# Patient Record
Sex: FEMALE | Race: ASIAN | NOT HISPANIC OR LATINO
[De-identification: names, ages, dates, MRNs, and addresses within clinical notes are randomized per-mention and may not be internally consistent; named-entity substitution may affect disease eponyms.]

---

## 2018-08-25 ENCOUNTER — NON-APPOINTMENT (OUTPATIENT)
Age: 78
End: 2018-08-25

## 2018-08-25 ENCOUNTER — APPOINTMENT (OUTPATIENT)
Dept: INTERNAL MEDICINE | Facility: CLINIC | Age: 78
End: 2018-08-25
Payer: MEDICARE

## 2018-08-25 VITALS
SYSTOLIC BLOOD PRESSURE: 120 MMHG | BODY MASS INDEX: 23.95 KG/M2 | DIASTOLIC BLOOD PRESSURE: 80 MMHG | WEIGHT: 122 LBS | HEIGHT: 60 IN

## 2018-08-25 DIAGNOSIS — Z80.8 FAMILY HISTORY OF MALIGNANT NEOPLASM OF OTHER ORGANS OR SYSTEMS: ICD-10-CM

## 2018-08-25 DIAGNOSIS — Z84.89 FAMILY HISTORY OF OTHER SPECIFIED CONDITIONS: ICD-10-CM

## 2018-08-25 DIAGNOSIS — Z13.820 ENCOUNTER FOR SCREENING FOR OSTEOPOROSIS: ICD-10-CM

## 2018-08-25 DIAGNOSIS — Z78.9 OTHER SPECIFIED HEALTH STATUS: ICD-10-CM

## 2018-08-25 DIAGNOSIS — Z86.79 PERSONAL HISTORY OF OTHER DISEASES OF THE CIRCULATORY SYSTEM: ICD-10-CM

## 2018-08-25 DIAGNOSIS — M85.80 OTHER SPECIFIED DISORDERS OF BONE DENSITY AND STRUCTURE, UNSPECIFIED SITE: ICD-10-CM

## 2018-08-25 DIAGNOSIS — H93.12 TINNITUS, LEFT EAR: ICD-10-CM

## 2018-08-25 PROCEDURE — G0439: CPT | Mod: 25

## 2018-08-25 PROCEDURE — 93000 ELECTROCARDIOGRAM COMPLETE: CPT

## 2018-08-25 RX ORDER — LEVOTHYROXINE SODIUM 88 UG/1
88 TABLET ORAL
Qty: 90 | Refills: 0 | Status: DISCONTINUED | COMMUNITY
Start: 2018-04-23 | End: 2018-08-25

## 2018-08-27 ENCOUNTER — RX RENEWAL (OUTPATIENT)
Age: 78
End: 2018-08-27

## 2018-08-27 PROBLEM — Z80.8 FAMILY HISTORY OF MALIGNANT NEOPLASM OF BRAIN: Status: ACTIVE | Noted: 2018-08-27

## 2018-08-27 PROBLEM — H93.12 TINNITUS OF LEFT EAR: Status: ACTIVE | Noted: 2018-08-27

## 2018-08-27 RX ORDER — CARBOXYMETHYLCELLULOSE SODIUM 10 MG/ML
1 GEL OPHTHALMIC
Refills: 0 | Status: ACTIVE | COMMUNITY
Start: 2018-08-27

## 2018-08-27 RX ORDER — VIT C/E/CUPERIC/ZINC/LUTEIN 226-90-0.8
CAPSULE ORAL
Refills: 0 | Status: ACTIVE | COMMUNITY
Start: 2018-08-27

## 2018-08-28 PROBLEM — Z86.79 HISTORY OF CARDIAC MURMUR: Status: RESOLVED | Noted: 2018-08-27 | Resolved: 2018-08-28

## 2018-08-28 PROBLEM — M85.80 OSTEOPENIA: Status: ACTIVE | Noted: 2018-08-28

## 2018-08-28 PROBLEM — Z13.820 SCREENING FOR OSTEOPOROSIS: Status: ACTIVE | Noted: 2018-08-28

## 2018-12-12 ENCOUNTER — RX RENEWAL (OUTPATIENT)
Age: 78
End: 2018-12-12

## 2018-12-18 ENCOUNTER — MEDICATION RENEWAL (OUTPATIENT)
Age: 78
End: 2018-12-18

## 2018-12-18 ENCOUNTER — RX RENEWAL (OUTPATIENT)
Age: 78
End: 2018-12-18

## 2019-01-16 ENCOUNTER — APPOINTMENT (OUTPATIENT)
Dept: INTERNAL MEDICINE | Facility: CLINIC | Age: 79
End: 2019-01-16
Payer: MEDICARE

## 2019-01-16 PROCEDURE — 90662 IIV NO PRSV INCREASED AG IM: CPT

## 2019-01-16 PROCEDURE — G0008: CPT

## 2019-01-25 ENCOUNTER — APPOINTMENT (OUTPATIENT)
Dept: INTERNAL MEDICINE | Facility: CLINIC | Age: 79
End: 2019-01-25
Payer: MEDICARE

## 2019-01-25 VITALS
HEIGHT: 60 IN | SYSTOLIC BLOOD PRESSURE: 120 MMHG | BODY MASS INDEX: 23.56 KG/M2 | DIASTOLIC BLOOD PRESSURE: 70 MMHG | WEIGHT: 120 LBS

## 2019-01-25 PROCEDURE — 99214 OFFICE O/P EST MOD 30 MIN: CPT

## 2019-03-09 RX ORDER — SITAGLIPTIN 50 MG/1
50 TABLET, FILM COATED ORAL
Qty: 90 | Refills: 0 | Status: DISCONTINUED | COMMUNITY
Start: 2019-01-25 | End: 2019-03-09

## 2019-03-29 ENCOUNTER — APPOINTMENT (OUTPATIENT)
Dept: INTERNAL MEDICINE | Facility: CLINIC | Age: 79
End: 2019-03-29
Payer: MEDICARE

## 2019-03-29 VITALS
SYSTOLIC BLOOD PRESSURE: 130 MMHG | WEIGHT: 122 LBS | DIASTOLIC BLOOD PRESSURE: 60 MMHG | HEIGHT: 61.5 IN | BODY MASS INDEX: 22.74 KG/M2

## 2019-03-29 DIAGNOSIS — R01.1 CARDIAC MURMUR, UNSPECIFIED: ICD-10-CM

## 2019-03-29 PROCEDURE — 90746 HEPB VACCINE 3 DOSE ADULT IM: CPT

## 2019-03-29 PROCEDURE — 90471 IMMUNIZATION ADMIN: CPT

## 2019-03-29 PROCEDURE — 99214 OFFICE O/P EST MOD 30 MIN: CPT | Mod: 25

## 2019-03-30 ENCOUNTER — RX RENEWAL (OUTPATIENT)
Age: 79
End: 2019-03-30

## 2019-04-27 ENCOUNTER — APPOINTMENT (OUTPATIENT)
Dept: INTERNAL MEDICINE | Facility: CLINIC | Age: 79
End: 2019-04-27
Payer: MEDICARE

## 2019-04-27 PROCEDURE — 36415 COLL VENOUS BLD VENIPUNCTURE: CPT

## 2019-06-10 ENCOUNTER — RX RENEWAL (OUTPATIENT)
Age: 79
End: 2019-06-10

## 2019-06-11 ENCOUNTER — RX RENEWAL (OUTPATIENT)
Age: 79
End: 2019-06-11

## 2019-06-19 ENCOUNTER — MEDICATION RENEWAL (OUTPATIENT)
Age: 79
End: 2019-06-19

## 2019-06-19 ENCOUNTER — RX RENEWAL (OUTPATIENT)
Age: 79
End: 2019-06-19

## 2019-07-16 ENCOUNTER — APPOINTMENT (OUTPATIENT)
Dept: INTERNAL MEDICINE | Facility: CLINIC | Age: 79
End: 2019-07-16
Payer: MEDICARE

## 2019-07-16 VITALS
WEIGHT: 125 LBS | SYSTOLIC BLOOD PRESSURE: 120 MMHG | HEIGHT: 61.5 IN | BODY MASS INDEX: 23.3 KG/M2 | DIASTOLIC BLOOD PRESSURE: 78 MMHG

## 2019-07-16 PROCEDURE — 99214 OFFICE O/P EST MOD 30 MIN: CPT

## 2019-07-16 RX ORDER — PRASUGREL 10 MG/1
10 TABLET, FILM COATED ORAL DAILY
Qty: 90 | Refills: 1 | Status: DISCONTINUED | COMMUNITY
Start: 2018-06-11 | End: 2019-07-16

## 2019-07-27 ENCOUNTER — APPOINTMENT (OUTPATIENT)
Dept: INTERNAL MEDICINE | Facility: CLINIC | Age: 79
End: 2019-07-27
Payer: MEDICARE

## 2019-07-27 VITALS — WEIGHT: 127 LBS | HEIGHT: 61.5 IN | BODY MASS INDEX: 23.67 KG/M2

## 2019-07-27 DIAGNOSIS — Z23 ENCOUNTER FOR IMMUNIZATION: ICD-10-CM

## 2019-07-27 PROCEDURE — 90746 HEPB VACCINE 3 DOSE ADULT IM: CPT

## 2019-07-27 PROCEDURE — 90471 IMMUNIZATION ADMIN: CPT

## 2019-07-29 PROBLEM — Z23 NEED FOR HEPATITIS B VACCINATION: Status: ACTIVE | Noted: 2019-07-29

## 2019-10-11 ENCOUNTER — APPOINTMENT (OUTPATIENT)
Dept: INTERNAL MEDICINE | Facility: CLINIC | Age: 79
End: 2019-10-11
Payer: MEDICARE

## 2019-10-11 ENCOUNTER — LABORATORY RESULT (OUTPATIENT)
Age: 79
End: 2019-10-11

## 2019-10-11 VITALS
DIASTOLIC BLOOD PRESSURE: 70 MMHG | BODY MASS INDEX: 24.74 KG/M2 | WEIGHT: 126 LBS | HEIGHT: 60 IN | SYSTOLIC BLOOD PRESSURE: 120 MMHG

## 2019-10-11 DIAGNOSIS — R35.0 FREQUENCY OF MICTURITION: ICD-10-CM

## 2019-10-11 PROCEDURE — 81003 URINALYSIS AUTO W/O SCOPE: CPT | Mod: QW

## 2019-10-11 PROCEDURE — 99214 OFFICE O/P EST MOD 30 MIN: CPT | Mod: 25

## 2019-10-11 RX ORDER — LEVOTHYROXINE SODIUM 75 UG/1
75 TABLET ORAL
Qty: 90 | Refills: 1 | Status: DISCONTINUED | COMMUNITY
Start: 2018-08-25 | End: 2019-10-11

## 2019-10-12 PROBLEM — R35.0 URINARY FREQUENCY: Status: ACTIVE | Noted: 2019-10-11

## 2019-10-12 LAB
BILIRUB UR QL STRIP: NORMAL
CLARITY UR: CLEAR
COLLECTION METHOD: NORMAL
GLUCOSE UR-MCNC: NORMAL
HCG UR QL: 0.2 EU/DL
HGB UR QL STRIP.AUTO: NORMAL
KETONES UR-MCNC: NORMAL
LEUKOCYTE ESTERASE UR QL STRIP: NORMAL
NITRITE UR QL STRIP: NORMAL
PH UR STRIP: 7
PROT UR STRIP-MCNC: NORMAL
SP GR UR STRIP: 1.01

## 2019-10-28 LAB — BACTERIA UR CULT: ABNORMAL

## 2019-11-20 ENCOUNTER — RX RENEWAL (OUTPATIENT)
Age: 79
End: 2019-11-20

## 2019-11-22 ENCOUNTER — RX RENEWAL (OUTPATIENT)
Age: 79
End: 2019-11-22

## 2019-12-03 ENCOUNTER — RX RENEWAL (OUTPATIENT)
Age: 79
End: 2019-12-03

## 2019-12-21 ENCOUNTER — APPOINTMENT (OUTPATIENT)
Dept: INTERNAL MEDICINE | Facility: CLINIC | Age: 79
End: 2019-12-21
Payer: MEDICARE

## 2019-12-21 VITALS
SYSTOLIC BLOOD PRESSURE: 130 MMHG | WEIGHT: 125 LBS | BODY MASS INDEX: 24.54 KG/M2 | DIASTOLIC BLOOD PRESSURE: 80 MMHG | HEIGHT: 60 IN

## 2019-12-21 PROCEDURE — 99214 OFFICE O/P EST MOD 30 MIN: CPT

## 2019-12-21 RX ORDER — RAMIPRIL 5 MG/1
5 CAPSULE ORAL
Qty: 90 | Refills: 1 | Status: DISCONTINUED | COMMUNITY
Start: 2018-03-12 | End: 2019-12-21

## 2019-12-21 RX ORDER — NITROFURANTOIN (MONOHYDRATE/MACROCRYSTALS) 25; 75 MG/1; MG/1
100 CAPSULE ORAL
Qty: 10 | Refills: 0 | Status: DISCONTINUED | COMMUNITY
Start: 2019-10-11 | End: 2019-12-21

## 2019-12-21 RX ORDER — VALSARTAN 160 MG/1
160 TABLET ORAL
Refills: 0 | Status: DISCONTINUED | COMMUNITY
Start: 2019-06-04 | End: 2019-12-18

## 2019-12-21 NOTE — PHYSICAL EXAM
[No Acute Distress] : no acute distress [Well Nourished] : well nourished [No Accessory Muscle Use] : no accessory muscle use [Clear to Auscultation] : lungs were clear to auscultation bilaterally [Regular Rhythm] : with a regular rhythm [Normal S1, S2] : normal S1 and S2 [No Murmur] : no murmur heard [No Edema] : there was no peripheral edema [Soft] : abdomen soft [Non Tender] : non-tender [Non-distended] : non-distended [No Masses] : no abdominal mass palpated [No HSM] : no HSM [Normal Affect] : the affect was normal [Normal Bowel Sounds] : normal bowel sounds [Normal Insight/Judgement] : insight and judgment were intact

## 2019-12-21 NOTE — HISTORY OF PRESENT ILLNESS
[Spouse] : spouse [FreeTextEntry1] : DM  [de-identified] : Of note - pt had seen cardiologist and diovan doubled to 360mg - then experience palpitations - went to ED had negative w/u including eKG with NSR PACs and PVCs \par pt self d/c'd diovan - now only on amlodipine\par no cardiac complaints\par no respiratory complaints\par \par hba1c @ hospital 7.4%

## 2020-01-31 ENCOUNTER — RX RENEWAL (OUTPATIENT)
Age: 80
End: 2020-01-31

## 2020-02-28 ENCOUNTER — RX RENEWAL (OUTPATIENT)
Age: 80
End: 2020-02-28

## 2020-03-07 ENCOUNTER — NON-APPOINTMENT (OUTPATIENT)
Age: 80
End: 2020-03-07

## 2020-03-07 ENCOUNTER — APPOINTMENT (OUTPATIENT)
Dept: INTERNAL MEDICINE | Facility: CLINIC | Age: 80
End: 2020-03-07
Payer: MEDICARE

## 2020-03-07 VITALS
SYSTOLIC BLOOD PRESSURE: 130 MMHG | HEIGHT: 60 IN | BODY MASS INDEX: 24.35 KG/M2 | DIASTOLIC BLOOD PRESSURE: 70 MMHG | WEIGHT: 124 LBS

## 2020-03-07 PROCEDURE — 93000 ELECTROCARDIOGRAM COMPLETE: CPT

## 2020-03-07 PROCEDURE — G0439: CPT | Mod: 25

## 2020-04-06 ENCOUNTER — RX RENEWAL (OUTPATIENT)
Age: 80
End: 2020-04-06

## 2020-06-18 ENCOUNTER — APPOINTMENT (OUTPATIENT)
Dept: INTERNAL MEDICINE | Facility: CLINIC | Age: 80
End: 2020-06-18

## 2020-07-18 ENCOUNTER — APPOINTMENT (OUTPATIENT)
Dept: INTERNAL MEDICINE | Facility: CLINIC | Age: 80
End: 2020-07-18
Payer: MEDICARE

## 2020-07-18 VITALS — HEIGHT: 60 IN | BODY MASS INDEX: 24.94 KG/M2 | TEMPERATURE: 98.7 F | WEIGHT: 127 LBS

## 2020-07-18 PROCEDURE — 99214 OFFICE O/P EST MOD 30 MIN: CPT

## 2020-07-18 RX ORDER — GLIMEPIRIDE 1 MG/1
1 TABLET ORAL DAILY
Qty: 60 | Refills: 0 | Status: DISCONTINUED | COMMUNITY
Start: 2020-07-08 | End: 2020-07-18

## 2020-08-15 ENCOUNTER — APPOINTMENT (OUTPATIENT)
Dept: INTERNAL MEDICINE | Facility: CLINIC | Age: 80
End: 2020-08-15

## 2020-08-29 ENCOUNTER — APPOINTMENT (OUTPATIENT)
Dept: INTERNAL MEDICINE | Facility: CLINIC | Age: 80
End: 2020-08-29
Payer: MEDICARE

## 2020-08-29 VITALS
DIASTOLIC BLOOD PRESSURE: 70 MMHG | WEIGHT: 125 LBS | SYSTOLIC BLOOD PRESSURE: 120 MMHG | BODY MASS INDEX: 24.54 KG/M2 | HEIGHT: 60 IN | TEMPERATURE: 97.9 F

## 2020-08-29 PROCEDURE — 99213 OFFICE O/P EST LOW 20 MIN: CPT

## 2020-08-29 RX ORDER — AMLODIPINE BESYLATE 5 MG/1
5 TABLET ORAL
Qty: 90 | Refills: 1 | Status: DISCONTINUED | COMMUNITY
Start: 2018-03-12 | End: 2020-08-29

## 2020-09-09 ENCOUNTER — RX RENEWAL (OUTPATIENT)
Age: 80
End: 2020-09-09

## 2020-10-08 ENCOUNTER — RX RENEWAL (OUTPATIENT)
Age: 80
End: 2020-10-08

## 2020-10-31 ENCOUNTER — APPOINTMENT (OUTPATIENT)
Dept: INTERNAL MEDICINE | Facility: CLINIC | Age: 80
End: 2020-10-31
Payer: MEDICARE

## 2020-10-31 VITALS
HEART RATE: 85 BPM | HEIGHT: 62 IN | WEIGHT: 124 LBS | BODY MASS INDEX: 22.82 KG/M2 | TEMPERATURE: 94.7 F | OXYGEN SATURATION: 100 %

## 2020-10-31 VITALS — DIASTOLIC BLOOD PRESSURE: 80 MMHG | SYSTOLIC BLOOD PRESSURE: 150 MMHG

## 2020-10-31 DIAGNOSIS — N81.4 UTEROVAGINAL PROLAPSE, UNSPECIFIED: ICD-10-CM

## 2020-10-31 DIAGNOSIS — Z23 ENCOUNTER FOR IMMUNIZATION: ICD-10-CM

## 2020-10-31 DIAGNOSIS — R92.2 INCONCLUSIVE MAMMOGRAM: ICD-10-CM

## 2020-10-31 PROCEDURE — 99072 ADDL SUPL MATRL&STAF TM PHE: CPT

## 2020-10-31 PROCEDURE — G0008: CPT

## 2020-10-31 PROCEDURE — 99214 OFFICE O/P EST MOD 30 MIN: CPT | Mod: 25

## 2020-10-31 PROCEDURE — 90662 IIV NO PRSV INCREASED AG IM: CPT

## 2020-10-31 RX ORDER — RAMIPRIL 5 MG/1
5 CAPSULE ORAL
Qty: 90 | Refills: 1 | Status: DISCONTINUED | COMMUNITY
Start: 2020-07-18 | End: 2020-10-31

## 2020-12-02 ENCOUNTER — NON-APPOINTMENT (OUTPATIENT)
Age: 80
End: 2020-12-02

## 2020-12-14 DIAGNOSIS — R91.8 OTHER NONSPECIFIC ABNORMAL FINDING OF LUNG FIELD: ICD-10-CM

## 2021-01-09 ENCOUNTER — APPOINTMENT (OUTPATIENT)
Dept: INTERNAL MEDICINE | Facility: CLINIC | Age: 81
End: 2021-01-09
Payer: MEDICARE

## 2021-01-09 VITALS — HEIGHT: 62 IN | BODY MASS INDEX: 23 KG/M2 | WEIGHT: 125 LBS | TEMPERATURE: 98 F

## 2021-01-09 VITALS — SYSTOLIC BLOOD PRESSURE: 120 MMHG | DIASTOLIC BLOOD PRESSURE: 80 MMHG

## 2021-01-09 PROCEDURE — 99072 ADDL SUPL MATRL&STAF TM PHE: CPT

## 2021-01-09 PROCEDURE — 99214 OFFICE O/P EST MOD 30 MIN: CPT

## 2021-04-09 ENCOUNTER — RX RENEWAL (OUTPATIENT)
Age: 81
End: 2021-04-09

## 2021-07-09 ENCOUNTER — RX RENEWAL (OUTPATIENT)
Age: 81
End: 2021-07-09

## 2021-09-21 ENCOUNTER — RX RENEWAL (OUTPATIENT)
Age: 81
End: 2021-09-21

## 2021-10-06 ENCOUNTER — RX RENEWAL (OUTPATIENT)
Age: 81
End: 2021-10-06

## 2021-10-13 ENCOUNTER — RX RENEWAL (OUTPATIENT)
Age: 81
End: 2021-10-13

## 2021-10-13 RX ORDER — AMLODIPINE BESYLATE 5 MG/1
5 TABLET ORAL
Qty: 90 | Refills: 2 | Status: ACTIVE | COMMUNITY
Start: 2020-10-31 | End: 1900-01-01

## 2021-10-25 ENCOUNTER — RX RENEWAL (OUTPATIENT)
Age: 81
End: 2021-10-25

## 2021-10-30 ENCOUNTER — APPOINTMENT (OUTPATIENT)
Dept: INTERNAL MEDICINE | Facility: CLINIC | Age: 81
End: 2021-10-30
Payer: MEDICARE

## 2021-10-30 VITALS — DIASTOLIC BLOOD PRESSURE: 70 MMHG | SYSTOLIC BLOOD PRESSURE: 170 MMHG

## 2021-10-30 VITALS — WEIGHT: 124 LBS | HEIGHT: 62 IN | BODY MASS INDEX: 22.82 KG/M2

## 2021-10-30 DIAGNOSIS — Z96.0 PRESENCE OF UROGENITAL IMPLANTS: ICD-10-CM

## 2021-10-30 DIAGNOSIS — R14.0 ABDOMINAL DISTENSION (GASEOUS): ICD-10-CM

## 2021-10-30 PROCEDURE — 99214 OFFICE O/P EST MOD 30 MIN: CPT

## 2021-10-30 RX ORDER — VALSARTAN 160 MG/1
160 TABLET, COATED ORAL TWICE DAILY
Qty: 180 | Refills: 1 | Status: ACTIVE | COMMUNITY
Start: 2020-10-31

## 2021-11-04 ENCOUNTER — NON-APPOINTMENT (OUTPATIENT)
Age: 81
End: 2021-11-04

## 2021-11-15 ENCOUNTER — RX RENEWAL (OUTPATIENT)
Age: 81
End: 2021-11-15

## 2021-12-18 ENCOUNTER — APPOINTMENT (OUTPATIENT)
Dept: DISASTER EMERGENCY | Facility: CLINIC | Age: 81
End: 2021-12-18

## 2021-12-20 ENCOUNTER — APPOINTMENT (OUTPATIENT)
Dept: PULMONOLOGY | Facility: CLINIC | Age: 81
End: 2021-12-20
Payer: MEDICARE

## 2021-12-20 VITALS
WEIGHT: 125 LBS | DIASTOLIC BLOOD PRESSURE: 68 MMHG | HEIGHT: 61 IN | OXYGEN SATURATION: 98 % | BODY MASS INDEX: 23.6 KG/M2 | HEART RATE: 81 BPM | TEMPERATURE: 98.7 F | SYSTOLIC BLOOD PRESSURE: 138 MMHG

## 2021-12-20 VITALS
TEMPERATURE: 97.6 F | DIASTOLIC BLOOD PRESSURE: 71 MMHG | HEART RATE: 87 BPM | OXYGEN SATURATION: 99 % | SYSTOLIC BLOOD PRESSURE: 182 MMHG

## 2021-12-20 PROCEDURE — 94010 BREATHING CAPACITY TEST: CPT

## 2021-12-20 PROCEDURE — 94726 PLETHYSMOGRAPHY LUNG VOLUMES: CPT

## 2021-12-20 PROCEDURE — ZZZZZ: CPT

## 2021-12-20 PROCEDURE — 99203 OFFICE O/P NEW LOW 30 MIN: CPT | Mod: 25

## 2021-12-20 PROCEDURE — 94729 DIFFUSING CAPACITY: CPT

## 2021-12-20 NOTE — HISTORY OF PRESENT ILLNESS
[TextBox_4] : 81 year old female with pmh HTN, HLD, DMII presents for an initial evaluation of pulmonary nodules.  She has been a lifetime nonsmoker.  Denies any significant pulmonary infections.  She denies dyspnea, cough, wheezing, chest tightness/chest discomfort. She denies arthritis, skin rashes, kidney issues, eye symptoms other than  macular degeneration, no unusual hobbies. She has no occupational exposures. She was born in the [facility]

## 2021-12-20 NOTE — END OF VISIT
[FreeTextEntry3] : I obtained the history and examined the patient and developed a plan of care  Benjie Low MD\par

## 2021-12-20 NOTE — PHYSICAL EXAM
[No Acute Distress] : no acute distress [Normal Appearance] : normal appearance [No Neck Mass] : no neck mass [Normal Rate/Rhythm] : normal rate/rhythm [No Resp Distress] : no resp distress [Normal Gait] : normal gait [No Clubbing] : no clubbing [No Edema] : no edema [No Focal Deficits] : no focal deficits [TextBox_54] : 1/6 systolic regurgitant murmur left sternal border [TextBox_68] : few crackles at left lung base

## 2021-12-20 NOTE — ASSESSMENT
[FreeTextEntry1] : I reviewed the patient's CAT scans and there is a small amount of what appears to be pulmonary fibrosis at both lung bases right greater than left. Her lung volumes and lung mechanics are normal but she does have a mild reduction in diffusing capacity. I walked her 480 feet and her oxygen saturations actually went up from 96-98% a normal response.\par There is clearly basilar pulmonary fibrosis but whether it is active and progressive is certainly unknown at this point. I asked her to return at approximate 6 months for repeat imaging as well as repeat lung function studies.\par She has no clinical evidence of a systemic disorder at this point.

## 2022-02-05 ENCOUNTER — NON-APPOINTMENT (OUTPATIENT)
Age: 82
End: 2022-02-05

## 2022-03-12 ENCOUNTER — APPOINTMENT (OUTPATIENT)
Dept: INTERNAL MEDICINE | Facility: CLINIC | Age: 82
End: 2022-03-12
Payer: MEDICARE

## 2022-03-12 VITALS
WEIGHT: 127 LBS | BODY MASS INDEX: 23.98 KG/M2 | DIASTOLIC BLOOD PRESSURE: 90 MMHG | HEIGHT: 61 IN | SYSTOLIC BLOOD PRESSURE: 110 MMHG

## 2022-03-12 PROCEDURE — 99214 OFFICE O/P EST MOD 30 MIN: CPT

## 2022-03-12 RX ORDER — INDAPAMIDE 1.25 MG/1
1.25 TABLET, FILM COATED ORAL DAILY
Qty: 90 | Refills: 1 | Status: ACTIVE | COMMUNITY
Start: 2022-03-12

## 2022-07-05 ENCOUNTER — APPOINTMENT (OUTPATIENT)
Dept: PULMONOLOGY | Facility: CLINIC | Age: 82
End: 2022-07-05

## 2022-07-05 VITALS
TEMPERATURE: 97.3 F | OXYGEN SATURATION: 97 % | DIASTOLIC BLOOD PRESSURE: 73 MMHG | SYSTOLIC BLOOD PRESSURE: 184 MMHG | WEIGHT: 122 LBS | HEIGHT: 61 IN | BODY MASS INDEX: 23.03 KG/M2 | HEART RATE: 73 BPM

## 2022-07-05 VITALS — SYSTOLIC BLOOD PRESSURE: 150 MMHG | DIASTOLIC BLOOD PRESSURE: 67 MMHG

## 2022-07-05 PROCEDURE — 99213 OFFICE O/P EST LOW 20 MIN: CPT

## 2022-07-05 NOTE — PHYSICAL EXAM
[No Acute Distress] : no acute distress [Normal Appearance] : normal appearance [Normal Rate/Rhythm] : normal rate/rhythm [Normal S1, S2] : normal s1, s2 [No Resp Distress] : no resp distress [Clear to Auscultation Bilaterally] : clear to auscultation bilaterally [Normal Gait] : normal gait [No Clubbing] : no clubbing [No Edema] : no edema

## 2022-07-05 NOTE — HISTORY OF PRESENT ILLNESS
[TextBox_4] : 81 year old female with pmh HTN, HLD, DMII presents for an initial evaluation of pulmonary nodules. She has been a lifetime nonsmoker. Denies any significant pulmonary infections. She denies dyspnea, cough, wheezing, chest tightness/chest discomfort. She denies arthritis, skin rashes, kidney issues, eye symptoms other than macular degeneration, no unusual hobbies. She has no occupational exposures. She was born in the [facility]. \par  \par last seen 12/2021. \par . The patient was supposed to have lung functions but did not schedule. She feels well and denies any pulmonary symptoms.

## 2022-07-05 NOTE — ASSESSMENT
[FreeTextEntry1] : CAT scans and there is a small amount of what appears to be pulmonary fibrosis at both lung bases right greater than left. Her lung volumes and lung mechanics are normal but she does have a mild reduction in diffusing capacity. I walked her 480 feet and her oxygen saturations actually went up from 96-98% a normal response.\par There is clearly basilar pulmonary fibrosis but whether it is active and progressive is certainly unknown at this point. I asked her to return at approximate 6 months for repeat imaging as well as repeat lung function studies.\par She has no clinical evidence of a systemic disorder at this point. \par 7/5/22\par the patient had a repeat CAT scan of her lung which is unchanged. She did not have lung functions on this visit. I did suggest that she schedule lung functions before the end of the year for me to review. Followup CAT scans will be dictated by her lung functions and her symptomatology.\par

## 2022-07-11 ENCOUNTER — NON-APPOINTMENT (OUTPATIENT)
Age: 82
End: 2022-07-11

## 2022-08-06 ENCOUNTER — APPOINTMENT (OUTPATIENT)
Dept: INTERNAL MEDICINE | Facility: CLINIC | Age: 82
End: 2022-08-06

## 2022-08-06 VITALS
HEIGHT: 61 IN | WEIGHT: 120 LBS | SYSTOLIC BLOOD PRESSURE: 120 MMHG | DIASTOLIC BLOOD PRESSURE: 80 MMHG | BODY MASS INDEX: 22.66 KG/M2

## 2022-08-06 PROCEDURE — 99214 OFFICE O/P EST MOD 30 MIN: CPT

## 2022-08-06 NOTE — HISTORY OF PRESENT ILLNESS
[Spouse] : spouse [FreeTextEntry1] : \par f/u  [de-identified] : \par DM - on meds - watchign diet \par \par HTN - on med \par \par HLD - on med \par \par has appt to see Dr. Cohen \par has appt with GYN Dr. Ramsey coming up \par \par in interval saw cardiologist Dr Pfeiffer \par saw pulmonary \par \par

## 2022-08-19 ENCOUNTER — RX RENEWAL (OUTPATIENT)
Age: 82
End: 2022-08-19

## 2022-10-13 ENCOUNTER — RX RENEWAL (OUTPATIENT)
Age: 82
End: 2022-10-13

## 2022-11-05 ENCOUNTER — OFFICE (OUTPATIENT)
Dept: URBAN - METROPOLITAN AREA CLINIC 35 | Facility: CLINIC | Age: 82
Setting detail: OPHTHALMOLOGY
End: 2022-11-05
Payer: MEDICARE

## 2022-11-05 DIAGNOSIS — H35.373: ICD-10-CM

## 2022-11-05 DIAGNOSIS — H25.13: ICD-10-CM

## 2022-11-05 DIAGNOSIS — H35.3112: ICD-10-CM

## 2022-11-05 DIAGNOSIS — E11.9: ICD-10-CM

## 2022-11-05 DIAGNOSIS — H26.8: ICD-10-CM

## 2022-11-05 DIAGNOSIS — H16.223: ICD-10-CM

## 2022-11-05 DIAGNOSIS — H35.3221: ICD-10-CM

## 2022-11-05 PROCEDURE — 99214 OFFICE O/P EST MOD 30 MIN: CPT | Performed by: OPHTHALMOLOGY

## 2022-11-05 PROCEDURE — 92083 EXTENDED VISUAL FIELD XM: CPT | Performed by: OPHTHALMOLOGY

## 2022-11-05 PROCEDURE — 92133 CPTRZD OPH DX IMG PST SGM ON: CPT | Performed by: OPHTHALMOLOGY

## 2022-11-05 ASSESSMENT — PACHYMETRY
OD_CT_CORRECTION: 3
OD_CT_UM: 505
OS_CT_UM: 524
OS_CT_CORRECTION: 1

## 2022-11-05 ASSESSMENT — KERATOMETRY
OS_K2POWER_DIOPTERS: 44.25
OS_AXISANGLE_DEGREES: 050
OD_K1POWER_DIOPTERS: 43.00
OS_K1POWER_DIOPTERS: 44.00
OD_K2POWER_DIOPTERS: 44.00
OD_AXISANGLE_DEGREES: 169

## 2022-11-05 ASSESSMENT — AXIALLENGTH_DERIVED
OD_AL: 22.66
OS_AL: 23.4124
OD_AL: 22.7015
OS_AL: 23.5572
OD_AL: 22.7921
OS_AL: 23.5572

## 2022-11-05 ASSESSMENT — VISUAL ACUITY
OD_BCVA: CF 2FT
OS_BCVA: 20/50+

## 2022-11-05 ASSESSMENT — REFRACTION_MANIFEST
OS_AXIS: 120
OS_SPHERE: +0.50
OS_CYLINDER: -1.75
OD_AXIS: 0
OD_CYLINDER: -2.25
OS_SPHERE: +0.75
OD_SPHERE: +3.50
OD_CYLINDER: -2.51
OD_SPHERE: +3.75
OD_VA1: 20/40-2+2
OD_AXIS: 085
OS_VA1: 20/400
OD_VA1: 20/30
OS_AXIS: 120
OS_VA1: 20/NI
OS_CYLINDER: -2.00

## 2022-11-05 ASSESSMENT — REFRACTION_AUTOREFRACTION
OD_CYLINDER: -2.75
OS_AXIS: 122
OS_CYLINDER: -2.00
OS_SPHERE: +0.50
OD_SPHERE: +3.50
OD_AXIS: 086

## 2022-11-05 ASSESSMENT — CONFRONTATIONAL VISUAL FIELD TEST (CVF)
OD_FINDINGS: FULL
OS_FINDINGS: FULL

## 2022-11-05 ASSESSMENT — REFRACTION_CURRENTRX
OD_AXIS: 096
OD_CYLINDER: -2.00
OD_ADD: +3.25
OD_OVR_VA: 20/
OD_SPHERE: +3.25
OS_AXIS: 091
OS_VPRISM_DIRECTION: BF
OS_ADD: +3.25
OS_SPHERE: +2.25
OS_OVR_VA: 20/
OS_CYLINDER: -0.75
OD_VPRISM_DIRECTION: BF

## 2022-11-05 ASSESSMENT — TONOMETRY: OD_IOP_MMHG: 18

## 2022-11-05 ASSESSMENT — SUPERFICIAL PUNCTATE KERATITIS (SPK)
OD_SPK: T
OS_SPK: T

## 2022-11-05 ASSESSMENT — SPHEQUIV_DERIVED
OS_SPHEQUIV: -0.5
OD_SPHEQUIV: 2.375
OS_SPHEQUIV: -0.5
OD_SPHEQUIV: 2.495
OD_SPHEQUIV: 2.125
OS_SPHEQUIV: -0.125

## 2022-11-07 ENCOUNTER — RX RENEWAL (OUTPATIENT)
Age: 82
End: 2022-11-07

## 2022-11-21 ENCOUNTER — OFFICE (OUTPATIENT)
Dept: URBAN - METROPOLITAN AREA CLINIC 109 | Facility: CLINIC | Age: 82
Setting detail: OPHTHALMOLOGY
End: 2022-11-21
Payer: MEDICARE

## 2022-11-21 DIAGNOSIS — H25.89: ICD-10-CM

## 2022-11-21 DIAGNOSIS — H35.373: ICD-10-CM

## 2022-11-21 DIAGNOSIS — H35.3221: ICD-10-CM

## 2022-11-21 DIAGNOSIS — H25.11: ICD-10-CM

## 2022-11-21 DIAGNOSIS — H25.13: ICD-10-CM

## 2022-11-21 DIAGNOSIS — H35.3112: ICD-10-CM

## 2022-11-21 DIAGNOSIS — H40.1121: ICD-10-CM

## 2022-11-21 PROCEDURE — 92136 OPHTHALMIC BIOMETRY: CPT | Performed by: OPHTHALMOLOGY

## 2022-11-21 PROCEDURE — 92134 CPTRZ OPH DX IMG PST SGM RTA: CPT | Performed by: OPHTHALMOLOGY

## 2022-11-21 PROCEDURE — 92020 GONIOSCOPY: CPT | Performed by: OPHTHALMOLOGY

## 2022-11-21 PROCEDURE — 99214 OFFICE O/P EST MOD 30 MIN: CPT | Performed by: OPHTHALMOLOGY

## 2022-11-21 ASSESSMENT — SPHEQUIV_DERIVED
OD_SPHEQUIV: 2.375
OS_SPHEQUIV: -0.125
OD_SPHEQUIV: 2.5
OS_SPHEQUIV: -0.5
OD_SPHEQUIV: 2.495
OS_SPHEQUIV: -1
OD_SPHEQUIV: 2.75

## 2022-11-21 ASSESSMENT — KERATOMETRY
OS_AXISANGLE2_DEGREES: 26
OD_K1POWER_DIOPTERS: 43.00
OS_AXISANGLE_DEGREES: 116
OS_K1POWER_DIOPTERS: 43.00
OD_K1K2_AVERAGE: 43.31
OS_AXISANGLE_DEGREES: 26
OD_AXISANGLE2_DEGREES: 175
OS_K1K2_AVERAGE: 43.56
OD_CYLPOWER_DEGREES: 0.62
OD_AXISANGLE_DEGREES: 175
OS_CYLPOWER_DEGREES: 1.12
OD_K2POWER_DIOPTERS: 43.62
OS_K2POWER_DIOPTERS: 44.12
OD_K1POWER_DIOPTERS: 43.00
OD_CYLAXISANGLE_DEGREES: 175
OS_K2POWER_DIOPTERS: 44.12
OS_K1POWER_DIOPTERS: 43.00
OD_K2POWER_DIOPTERS: 43.62
OD_AXISANGLE_DEGREES: 85
OS_CYLAXISANGLE_DEGREES: 26

## 2022-11-21 ASSESSMENT — REFRACTION_CURRENTRX
OD_AXIS: 096
OS_SPHERE: +2.25
OS_ADD: +3.25
OD_CYLINDER: -2.00
OD_OVR_VA: 20/
OS_OVR_VA: 20/
OD_SPHERE: +3.25
OS_CYLINDER: -0.75
OD_VPRISM_DIRECTION: BF
OS_AXIS: 091
OD_ADD: +3.25
OS_VPRISM_DIRECTION: BF

## 2022-11-21 ASSESSMENT — TONOMETRY
OD_IOP_MMHG: 18
OS_IOP_MMHG: 18

## 2022-11-21 ASSESSMENT — REFRACTION_MANIFEST
OS_AXIS: 120
OS_VA1: 20/NI
OD_SPHERE: +3.75
OS_VA1: 20/400
OD_CYLINDER: -2.51
OS_CYLINDER: -2.00
OD_AXIS: 0
OD_SPHERE: +3.75
OS_SPHERE: +0.75
OD_AXIS: 0
OD_SPHERE: +3.50
OD_CYLINDER: -2.50
OD_VA1: 20/NI
OS_AXIS: 120
OS_CYLINDER: -1.75
OS_SPHERE: +0.50
OD_CYLINDER: -2.25
OD_VA1: 20/40-2+2
OD_AXIS: 085
OD_VA1: 20/30

## 2022-11-21 ASSESSMENT — SUPERFICIAL PUNCTATE KERATITIS (SPK)
OS_SPK: T
OD_SPK: T

## 2022-11-21 ASSESSMENT — AXIALLENGTH_DERIVED
OD_AL: 22.7663
OD_AL: 22.7211
OD_AL: 22.6311
OD_AL: 22.72
OS_AL: 23.7659
OS_AL: 23.97
OS_AL: 23.6186

## 2022-11-21 ASSESSMENT — REFRACTION_AUTOREFRACTION
OD_SPHERE: +3.75
OD_AXIS: 90
OS_AXIS: 136
OS_CYLINDER: -1.00
OS_SPHERE: -0.50
OD_CYLINDER: -2.00

## 2022-11-21 ASSESSMENT — VISUAL ACUITY
OS_BCVA: 20/50+
OD_BCVA: CF 2FT

## 2022-11-21 ASSESSMENT — PACHYMETRY
OD_CT_CORRECTION: 3
OS_CT_CORRECTION: 1
OS_CT_UM: 524
OD_CT_UM: 505

## 2022-11-21 ASSESSMENT — CONFRONTATIONAL VISUAL FIELD TEST (CVF)
OS_FINDINGS: FULL
OD_FINDINGS: FULL

## 2022-12-15 ENCOUNTER — AMBULATORY SURGERY CENTER (OUTPATIENT)
Dept: URBAN - METROPOLITAN AREA SURGERY 19 | Facility: SURGERY | Age: 82
Setting detail: OPHTHALMOLOGY
End: 2022-12-15
Payer: MEDICARE

## 2022-12-15 DIAGNOSIS — H25.11: ICD-10-CM

## 2022-12-15 PROCEDURE — 66984 XCAPSL CTRC RMVL W/O ECP: CPT | Performed by: OPHTHALMOLOGY

## 2022-12-16 ENCOUNTER — RX ONLY (RX ONLY)
Age: 82
End: 2022-12-16

## 2022-12-16 ENCOUNTER — OFFICE (OUTPATIENT)
Dept: URBAN - METROPOLITAN AREA CLINIC 109 | Facility: CLINIC | Age: 82
Setting detail: OPHTHALMOLOGY
End: 2022-12-16
Payer: MEDICARE

## 2022-12-16 DIAGNOSIS — Z96.1: ICD-10-CM

## 2022-12-16 PROBLEM — H40.1121 GLAUCOMA-PRIMARY OPEN ANGLE; LEFT EYE MILD: Status: ACTIVE | Noted: 2022-11-21

## 2022-12-16 PROCEDURE — 99024 POSTOP FOLLOW-UP VISIT: CPT | Performed by: OPHTHALMOLOGY

## 2022-12-16 ASSESSMENT — PACHYMETRY
OD_CT_CORRECTION: 3
OS_CT_CORRECTION: 1
OS_CT_UM: 524
OD_CT_UM: 505

## 2022-12-16 ASSESSMENT — CONFRONTATIONAL VISUAL FIELD TEST (CVF)
OD_FINDINGS: FULL
OS_FINDINGS: FULL

## 2022-12-16 ASSESSMENT — AXIALLENGTH_DERIVED
OD_AL: 22.6311
OD_AL: 22.7663
OS_AL: 23.6186
OS_AL: 23.97
OD_AL: 22.7211
OS_AL: 23.7659
OD_AL: 22.72

## 2022-12-16 ASSESSMENT — REFRACTION_MANIFEST
OD_CYLINDER: -2.25
OD_VA1: 20/30
OD_SPHERE: +3.75
OS_CYLINDER: -1.75
OS_SPHERE: +0.50
OS_AXIS: 120
OS_VA1: 20/400
OD_CYLINDER: -2.50
OD_CYLINDER: -2.51
OS_SPHERE: +0.75
OD_AXIS: 085
OD_AXIS: 0
OS_VA1: 20/NI
OD_SPHERE: +3.50
OS_AXIS: 120
OD_SPHERE: +3.75
OD_VA1: 20/NI
OS_CYLINDER: -2.00
OD_VA1: 20/40-2+2
OD_AXIS: 0

## 2022-12-16 ASSESSMENT — REFRACTION_CURRENTRX
OD_SPHERE: +3.25
OD_AXIS: 096
OS_SPHERE: +2.25
OS_ADD: +3.25
OS_VPRISM_DIRECTION: BF
OS_CYLINDER: -0.75
OS_AXIS: 091
OD_OVR_VA: 20/
OD_ADD: +3.25
OD_CYLINDER: -2.00
OD_VPRISM_DIRECTION: BF
OS_OVR_VA: 20/

## 2022-12-16 ASSESSMENT — SPHEQUIV_DERIVED
OS_SPHEQUIV: -0.125
OD_SPHEQUIV: 2.495
OD_SPHEQUIV: 2.75
OS_SPHEQUIV: -0.5
OS_SPHEQUIV: -1
OD_SPHEQUIV: 2.5
OD_SPHEQUIV: 2.375

## 2022-12-16 ASSESSMENT — REFRACTION_AUTOREFRACTION
OS_AXIS: 136
OD_AXIS: 90
OS_SPHERE: -0.50
OD_SPHERE: +3.75
OS_CYLINDER: -1.00
OD_CYLINDER: -2.00

## 2022-12-16 ASSESSMENT — KERATOMETRY
OS_K1POWER_DIOPTERS: 43.00
OS_AXISANGLE_DEGREES: 26
OD_AXISANGLE_DEGREES: 175
OD_K1POWER_DIOPTERS: 43.00
OD_K2POWER_DIOPTERS: 43.62
OS_K2POWER_DIOPTERS: 44.12

## 2022-12-16 ASSESSMENT — VISUAL ACUITY
OD_BCVA: CF 2FT
OS_BCVA: 20/60

## 2022-12-16 ASSESSMENT — SUPERFICIAL PUNCTATE KERATITIS (SPK)
OD_SPK: T
OS_SPK: T

## 2022-12-16 ASSESSMENT — TONOMETRY: OD_IOP_MMHG: 17

## 2023-01-06 ENCOUNTER — OFFICE (OUTPATIENT)
Dept: URBAN - METROPOLITAN AREA CLINIC 35 | Facility: CLINIC | Age: 83
Setting detail: OPHTHALMOLOGY
End: 2023-01-06
Payer: MEDICARE

## 2023-01-06 ENCOUNTER — RX ONLY (RX ONLY)
Age: 83
End: 2023-01-06

## 2023-01-06 DIAGNOSIS — H16.223: ICD-10-CM

## 2023-01-06 DIAGNOSIS — H25.22: ICD-10-CM

## 2023-01-06 DIAGNOSIS — H25.12: ICD-10-CM

## 2023-01-06 DIAGNOSIS — Z96.1: ICD-10-CM

## 2023-01-06 DIAGNOSIS — H35.3112: ICD-10-CM

## 2023-01-06 DIAGNOSIS — H40.1121: ICD-10-CM

## 2023-01-06 DIAGNOSIS — H25.89: ICD-10-CM

## 2023-01-06 DIAGNOSIS — H35.3221: ICD-10-CM

## 2023-01-06 PROCEDURE — 99024 POSTOP FOLLOW-UP VISIT: CPT | Performed by: OPHTHALMOLOGY

## 2023-01-06 ASSESSMENT — SPHEQUIV_DERIVED
OD_SPHEQUIV: 2.5
OS_SPHEQUIV: -0.5
OD_SPHEQUIV: 2.75
OD_SPHEQUIV: 2.375
OD_SPHEQUIV: 2.495
OS_SPHEQUIV: -0.125
OS_SPHEQUIV: -1

## 2023-01-06 ASSESSMENT — TONOMETRY
OS_IOP_MMHG: 16
OD_IOP_MMHG: 16

## 2023-01-06 ASSESSMENT — REFRACTION_MANIFEST
OD_VA1: 20/30
OS_AXIS: 120
OD_SPHERE: +3.75
OD_SPHERE: PLANO
OD_AXIS: 085
OS_VA1: 20/NI
OS_CYLINDER: -1.75
OS_AXIS: 120
OD_CYLINDER: -2.51
OS_VA1: 20/400
OD_AXIS: 0
OD_CYLINDER: -2.50
OS_CYLINDER: -2.00
OD_AXIS: 0
OD_VA1: 20/NI
OD_SPHERE: +3.50
OS_SPHERE: +0.75
OD_CYLINDER: -0.75
OS_SPHERE: +0.50
OD_SPHERE: +3.75
OD_CYLINDER: -2.25
OD_AXIS: 095
OD_VA1: 20/25-2
OD_VA1: 20/40-2+2

## 2023-01-06 ASSESSMENT — REFRACTION_CURRENTRX
OS_OVR_VA: 20/
OD_ADD: +3.25
OD_VPRISM_DIRECTION: BF
OS_SPHERE: +2.25
OS_AXIS: 091
OS_ADD: +3.25
OD_SPHERE: +3.25
OD_OVR_VA: 20/
OD_CYLINDER: -2.00
OD_AXIS: 096
OS_VPRISM_DIRECTION: BF
OS_CYLINDER: -0.75

## 2023-01-06 ASSESSMENT — KERATOMETRY
OD_K1POWER_DIOPTERS: 43.00
OD_K2POWER_DIOPTERS: 43.62
OD_AXISANGLE_DEGREES: 175
OS_K2POWER_DIOPTERS: 44.12
OS_K1POWER_DIOPTERS: 43.00
OS_AXISANGLE_DEGREES: 26

## 2023-01-06 ASSESSMENT — REFRACTION_AUTOREFRACTION
OD_SPHERE: +3.75
OS_CYLINDER: -1.00
OD_AXIS: 90
OD_CYLINDER: -2.00
OS_AXIS: 136
OS_SPHERE: -0.50

## 2023-01-06 ASSESSMENT — AXIALLENGTH_DERIVED
OS_AL: 23.7659
OS_AL: 23.97
OD_AL: 22.7663
OD_AL: 22.72
OS_AL: 23.6186
OD_AL: 22.6311
OD_AL: 22.7211

## 2023-01-06 ASSESSMENT — PACHYMETRY
OD_CT_UM: 505
OS_CT_UM: 524
OS_CT_CORRECTION: 1
OD_CT_CORRECTION: 3

## 2023-01-06 ASSESSMENT — SUPERFICIAL PUNCTATE KERATITIS (SPK)
OS_SPK: T
OD_SPK: T

## 2023-01-06 ASSESSMENT — CONFRONTATIONAL VISUAL FIELD TEST (CVF)
OD_FINDINGS: FULL
OS_FINDINGS: FULL

## 2023-01-06 ASSESSMENT — VISUAL ACUITY
OS_BCVA: 20/30
OD_BCVA: CF 3FT

## 2023-01-11 ENCOUNTER — OFFICE (OUTPATIENT)
Dept: URBAN - METROPOLITAN AREA CLINIC 109 | Facility: CLINIC | Age: 83
Setting detail: OPHTHALMOLOGY
End: 2023-01-11
Payer: MEDICARE

## 2023-01-11 DIAGNOSIS — H25.12: ICD-10-CM

## 2023-01-11 DIAGNOSIS — Z96.1: ICD-10-CM

## 2023-01-11 DIAGNOSIS — H35.3221: ICD-10-CM

## 2023-01-11 PROCEDURE — 92136 OPHTHALMIC BIOMETRY: CPT | Performed by: OPHTHALMOLOGY

## 2023-01-11 PROCEDURE — 99024 POSTOP FOLLOW-UP VISIT: CPT | Performed by: OPHTHALMOLOGY

## 2023-01-11 ASSESSMENT — VISUAL ACUITY
OD_BCVA: CF 3FT
OS_BCVA: 20/30

## 2023-01-11 ASSESSMENT — REFRACTION_CURRENTRX
OD_AXIS: 096
OD_SPHERE: +3.25
OD_OVR_VA: 20/
OS_CYLINDER: -0.75
OS_OVR_VA: 20/
OD_ADD: +3.25
OS_ADD: +3.25
OS_SPHERE: +2.25
OS_VPRISM_DIRECTION: BF
OD_VPRISM_DIRECTION: BF
OD_CYLINDER: -2.00
OS_AXIS: 091

## 2023-01-11 ASSESSMENT — REFRACTION_MANIFEST
OD_AXIS: 0
OS_SPHERE: +0.75
OD_AXIS: 095
OD_CYLINDER: -2.51
OS_SPHERE: +0.50
OS_AXIS: 120
OD_CYLINDER: -2.25
OS_VA1: 20/NI
OD_VA1: 20/NI
OD_VA1: 20/30
OD_CYLINDER: -2.50
OS_CYLINDER: -2.00
OD_CYLINDER: -0.75
OD_VA1: 20/25-2
OD_AXIS: 0
OS_VA1: 20/400
OS_CYLINDER: -1.75
OD_AXIS: 085
OS_AXIS: 120
OD_SPHERE: +3.75
OD_SPHERE: +3.50
OD_SPHERE: +3.75
OD_VA1: 20/40-2+2
OD_SPHERE: PLANO

## 2023-01-11 ASSESSMENT — KERATOMETRY
OD_K2POWER_DIOPTERS: 43.62
OD_K1POWER_DIOPTERS: 43.00
OS_AXISANGLE_DEGREES: 26
OD_AXISANGLE_DEGREES: 175
OS_K2POWER_DIOPTERS: 44.12
OS_K1POWER_DIOPTERS: 43.00

## 2023-01-11 ASSESSMENT — CONFRONTATIONAL VISUAL FIELD TEST (CVF)
OD_FINDINGS: FULL
OS_FINDINGS: FULL

## 2023-01-11 ASSESSMENT — AXIALLENGTH_DERIVED
OS_AL: 23.7659
OS_AL: 23.6186
OD_AL: 22.7663
OD_AL: 22.7211
OD_AL: 23.8595
OD_AL: 22.72

## 2023-01-11 ASSESSMENT — SPHEQUIV_DERIVED
OD_SPHEQUIV: 2.495
OS_SPHEQUIV: -0.125
OD_SPHEQUIV: -0.5
OD_SPHEQUIV: 2.375
OD_SPHEQUIV: 2.5
OS_SPHEQUIV: -0.5

## 2023-01-11 ASSESSMENT — TONOMETRY: OD_IOP_MMHG: 15

## 2023-01-11 ASSESSMENT — PACHYMETRY
OD_CT_UM: 505
OD_CT_CORRECTION: 3
OS_CT_CORRECTION: 1
OS_CT_UM: 524

## 2023-01-11 ASSESSMENT — REFRACTION_AUTOREFRACTION
OS_SPHERE: UNABLE
OD_SPHERE: -0.25
OD_CYLINDER: -0.50
OD_AXIS: 93

## 2023-01-11 ASSESSMENT — SUPERFICIAL PUNCTATE KERATITIS (SPK)
OS_SPK: T
OD_SPK: T

## 2023-01-12 ENCOUNTER — APPOINTMENT (OUTPATIENT)
Dept: PULMONOLOGY | Facility: CLINIC | Age: 83
End: 2023-01-12

## 2023-01-12 ENCOUNTER — RX RENEWAL (OUTPATIENT)
Age: 83
End: 2023-01-12

## 2023-02-02 ENCOUNTER — AMBULATORY SURGERY CENTER (OUTPATIENT)
Dept: URBAN - METROPOLITAN AREA SURGERY 19 | Facility: SURGERY | Age: 83
Setting detail: OPHTHALMOLOGY
End: 2023-02-02
Payer: MEDICARE

## 2023-02-02 ENCOUNTER — APPOINTMENT (OUTPATIENT)
Dept: PULMONOLOGY | Facility: CLINIC | Age: 83
End: 2023-02-02

## 2023-02-02 DIAGNOSIS — H57.03: ICD-10-CM

## 2023-02-02 DIAGNOSIS — H25.12: ICD-10-CM

## 2023-02-02 PROCEDURE — 66982 XCAPSL CTRC RMVL CPLX WO ECP: CPT | Performed by: OPHTHALMOLOGY

## 2023-02-03 ENCOUNTER — OFFICE (OUTPATIENT)
Dept: URBAN - METROPOLITAN AREA CLINIC 109 | Facility: CLINIC | Age: 83
Setting detail: OPHTHALMOLOGY
End: 2023-02-03
Payer: MEDICARE

## 2023-02-03 DIAGNOSIS — Z96.1: ICD-10-CM

## 2023-02-03 PROBLEM — H16.223 DRY EYE SYNDROME K SICCA;  ,, BOTH EYES: Status: ACTIVE | Noted: 2023-01-06

## 2023-02-03 PROBLEM — H25.22 MORGAGNIAN CATARACT; LEFT EYE: Status: ACTIVE | Noted: 2023-01-06

## 2023-02-03 PROCEDURE — 99024 POSTOP FOLLOW-UP VISIT: CPT | Performed by: OPHTHALMOLOGY

## 2023-02-03 ASSESSMENT — REFRACTION_CURRENTRX
OD_ADD: +3.25
OS_OVR_VA: 20/
OS_CYLINDER: -0.75
OD_SPHERE: +3.25
OS_VPRISM_DIRECTION: BF
OD_CYLINDER: -2.00
OD_AXIS: 096
OD_OVR_VA: 20/
OS_ADD: +3.25
OS_SPHERE: +2.25
OD_VPRISM_DIRECTION: BF
OS_AXIS: 091

## 2023-02-03 ASSESSMENT — AXIALLENGTH_DERIVED
OD_AL: 22.72
OS_AL: 23.7659
OD_AL: 22.7663
OS_AL: 23.6186
OD_AL: 23.8595
OD_AL: 22.7211

## 2023-02-03 ASSESSMENT — REFRACTION_MANIFEST
OS_AXIS: 120
OS_SPHERE: +0.75
OD_VA1: 20/40-2+2
OS_VA1: 20/400
OD_AXIS: 0
OD_AXIS: 0
OD_AXIS: 085
OD_CYLINDER: -2.51
OS_VA1: 20/NI
OD_VA1: 20/30
OD_VA1: 20/25-2
OD_SPHERE: +3.75
OD_VA1: 20/NI
OS_CYLINDER: -1.75
OS_CYLINDER: -2.00
OS_AXIS: 120
OD_CYLINDER: -0.75
OD_CYLINDER: -2.25
OD_CYLINDER: -2.50
OD_SPHERE: PLANO
OD_AXIS: 095
OS_SPHERE: +0.50
OD_SPHERE: +3.50
OD_SPHERE: +3.75

## 2023-02-03 ASSESSMENT — REFRACTION_AUTOREFRACTION
OS_SPHERE: UNABLE
OD_SPHERE: -0.25
OD_AXIS: 93
OD_CYLINDER: -0.50

## 2023-02-03 ASSESSMENT — SPHEQUIV_DERIVED
OD_SPHEQUIV: 2.495
OS_SPHEQUIV: -0.5
OS_SPHEQUIV: -0.125
OD_SPHEQUIV: -0.5
OD_SPHEQUIV: 2.5
OD_SPHEQUIV: 2.375

## 2023-02-03 ASSESSMENT — KERATOMETRY
OD_K1POWER_DIOPTERS: 43.00
OS_K2POWER_DIOPTERS: 44.12
OS_K1POWER_DIOPTERS: 43.00
OD_K2POWER_DIOPTERS: 43.62
OD_AXISANGLE_DEGREES: 175
OS_AXISANGLE_DEGREES: 26

## 2023-02-03 ASSESSMENT — PACHYMETRY
OS_CT_UM: 524
OD_CT_CORRECTION: 3
OS_CT_CORRECTION: 1
OD_CT_UM: 505

## 2023-02-03 ASSESSMENT — CONFRONTATIONAL VISUAL FIELD TEST (CVF)
OD_FINDINGS: FULL
OS_FINDINGS: FULL

## 2023-02-03 ASSESSMENT — VISUAL ACUITY
OS_BCVA: 20/30
OD_BCVA: 20/500

## 2023-02-03 ASSESSMENT — SUPERFICIAL PUNCTATE KERATITIS (SPK)
OS_SPK: T
OD_SPK: T

## 2023-02-23 ENCOUNTER — OFFICE (OUTPATIENT)
Dept: URBAN - METROPOLITAN AREA CLINIC 35 | Facility: CLINIC | Age: 83
Setting detail: OPHTHALMOLOGY
End: 2023-02-23
Payer: MEDICARE

## 2023-02-23 DIAGNOSIS — H40.1121: ICD-10-CM

## 2023-02-23 DIAGNOSIS — H35.3221: ICD-10-CM

## 2023-02-23 DIAGNOSIS — H25.89: ICD-10-CM

## 2023-02-23 DIAGNOSIS — H35.3112: ICD-10-CM

## 2023-02-23 DIAGNOSIS — Z96.1: ICD-10-CM

## 2023-02-23 PROCEDURE — 99024 POSTOP FOLLOW-UP VISIT: CPT | Performed by: OPHTHALMOLOGY

## 2023-02-23 ASSESSMENT — AXIALLENGTH_DERIVED
OD_AL: 23.6519
OD_AL: 22.49
OS_AL: 23.3673
OS_AL: 23.5115
OD_AL: 22.4884
OD_AL: 22.5327

## 2023-02-23 ASSESSMENT — REFRACTION_CURRENTRX
OD_CYLINDER: -2.00
OD_ADD: +3.25
OD_AXIS: 096
OS_ADD: +3.25
OD_VPRISM_DIRECTION: BF
OD_OVR_VA: 20/
OS_OVR_VA: 20/
OS_AXIS: 091
OS_CYLINDER: -0.75
OS_VPRISM_DIRECTION: BF
OS_SPHERE: +2.25
OD_SPHERE: +3.25

## 2023-02-23 ASSESSMENT — REFRACTION_MANIFEST
OD_VA1: 20/40-2+2
OD_AXIS: 0
OD_CYLINDER: -0.75
OD_SPHERE: PLANO
OD_AXIS: 0
OD_AXIS: 095
OD_CYLINDER: -1.25
OD_SPHERE: +3.75
OD_CYLINDER: -2.25
OD_VA1: 20/25+2
OD_CYLINDER: -2.51
OD_SPHERE: +3.75
OS_VA1: 20/NI
OD_SPHERE: +3.50
OD_VA1: 20/NI
OD_AXIS: 085
OD_VA1: 20/25-2
OD_VA1: 20/30
OD_SPHERE: PLANO
OS_AXIS: 120
OD_AXIS: 085
OS_AXIS: 120
OS_VA1: 20/400
OS_SPHERE: +0.75
OS_SPHERE: +0.50
OS_CYLINDER: -2.00
OD_CYLINDER: -2.50
OS_CYLINDER: -1.75

## 2023-02-23 ASSESSMENT — SPHEQUIV_DERIVED
OD_SPHEQUIV: 2.375
OS_SPHEQUIV: -0.5
OD_SPHEQUIV: 2.5
OD_SPHEQUIV: -0.625
OD_SPHEQUIV: 2.495
OS_SPHEQUIV: -0.125

## 2023-02-23 ASSESSMENT — CONFRONTATIONAL VISUAL FIELD TEST (CVF)
OD_FINDINGS: FULL
OS_FINDINGS: FULL

## 2023-02-23 ASSESSMENT — REFRACTION_AUTOREFRACTION
OD_AXIS: 089
OD_SPHERE: 0.00
OS_SPHERE: UNABLE
OD_CYLINDER: -1.25

## 2023-02-23 ASSESSMENT — KERATOMETRY
OD_K1POWER_DIOPTERS: 44.00
OS_AXISANGLE_DEGREES: 117
OS_K2POWER_DIOPTERS: 44.50
OD_K2POWER_DIOPTERS: 44.00
OD_AXISANGLE_DEGREES: 090
OS_K1POWER_DIOPTERS: 44.00

## 2023-02-23 ASSESSMENT — VISUAL ACUITY
OS_BCVA: 20/40
OD_BCVA: 20/HM

## 2023-02-23 ASSESSMENT — SUPERFICIAL PUNCTATE KERATITIS (SPK)
OD_SPK: T
OS_SPK: T

## 2023-02-24 ENCOUNTER — APPOINTMENT (OUTPATIENT)
Dept: PULMONOLOGY | Facility: CLINIC | Age: 83
End: 2023-02-24

## 2023-03-15 ENCOUNTER — APPOINTMENT (OUTPATIENT)
Dept: PULMONOLOGY | Facility: CLINIC | Age: 83
End: 2023-03-15
Payer: MEDICARE

## 2023-03-15 VITALS
HEIGHT: 61 IN | DIASTOLIC BLOOD PRESSURE: 82 MMHG | OXYGEN SATURATION: 99 % | HEART RATE: 70 BPM | BODY MASS INDEX: 23.41 KG/M2 | WEIGHT: 124 LBS | SYSTOLIC BLOOD PRESSURE: 186 MMHG

## 2023-03-15 DIAGNOSIS — J84.10 PULMONARY FIBROSIS, UNSPECIFIED: ICD-10-CM

## 2023-03-15 PROCEDURE — 94010 BREATHING CAPACITY TEST: CPT

## 2023-03-15 PROCEDURE — 94726 PLETHYSMOGRAPHY LUNG VOLUMES: CPT

## 2023-03-15 PROCEDURE — ZZZZZ: CPT

## 2023-03-15 PROCEDURE — 99213 OFFICE O/P EST LOW 20 MIN: CPT | Mod: 25

## 2023-03-15 PROCEDURE — 94729 DIFFUSING CAPACITY: CPT

## 2023-03-15 NOTE — ASSESSMENT
[FreeTextEntry1] : Unfortunately patient did not take her blood pressure pills this morning and she came in quite hypertensive. Her lung functions are unchanged and in view of lack of symptoms, no followup CAT scan At this point. She had CT scans and 20/20 and 2022 which revealed stable subcentimeter pulmonary nodules and stable pulmonary fibrosis.\par I asked her to followup with us in one year.

## 2023-03-15 NOTE — HISTORY OF PRESENT ILLNESS
[TextBox_4] : 83 -year-old female noted to have minimal pulmonary fibrosis in 2021.. She was asymptomatic and had no evidence of systemic disease. At that time her lung functions were normal except for reduction in diffusing capacity. She is here today for followup lung function studies. She denies any pulmonary symptoms.

## 2023-03-15 NOTE — PHYSICAL EXAM
[No Acute Distress] : no acute distress [Normal Appearance] : normal appearance [Normal Rate/Rhythm] : normal rate/rhythm [Normal S1, S2] : normal s1, s2 [No Resp Distress] : no resp distress [Normal Gait] : normal gait [No Clubbing] : no clubbing [TextBox_68] : crackles right lung base

## 2023-03-17 ENCOUNTER — RX RENEWAL (OUTPATIENT)
Age: 83
End: 2023-03-17

## 2023-04-18 ENCOUNTER — NON-APPOINTMENT (OUTPATIENT)
Age: 83
End: 2023-04-18

## 2023-04-25 ENCOUNTER — NON-APPOINTMENT (OUTPATIENT)
Age: 83
End: 2023-04-25

## 2023-05-27 ENCOUNTER — APPOINTMENT (OUTPATIENT)
Dept: INTERNAL MEDICINE | Facility: CLINIC | Age: 83
End: 2023-05-27
Payer: MEDICARE

## 2023-05-27 VITALS
BODY MASS INDEX: 23.22 KG/M2 | WEIGHT: 123 LBS | DIASTOLIC BLOOD PRESSURE: 70 MMHG | HEIGHT: 61 IN | SYSTOLIC BLOOD PRESSURE: 130 MMHG

## 2023-05-27 PROCEDURE — 99214 OFFICE O/P EST MOD 30 MIN: CPT

## 2023-05-27 RX ORDER — LEVOTHYROXINE SODIUM 0.09 MG/1
88 TABLET ORAL
Qty: 90 | Refills: 1 | Status: DISCONTINUED | COMMUNITY
Start: 2019-10-11 | End: 2023-05-27

## 2023-05-27 NOTE — HISTORY OF PRESENT ILLNESS
[Spouse] : spouse [FreeTextEntry1] : \par f/u  [de-identified] : DM -  - taking 2 metformin er 500mg daily \par watching diet - walking \par \par CARDS - went to see her cardiologist @ [facility] - scheduled for a stress test \par \par HLD - on med\par \par goes to opthamologist on regular basis\par \par

## 2023-06-03 ENCOUNTER — OFFICE (OUTPATIENT)
Dept: URBAN - METROPOLITAN AREA CLINIC 35 | Facility: CLINIC | Age: 83
Setting detail: OPHTHALMOLOGY
End: 2023-06-03
Payer: MEDICARE

## 2023-06-03 ENCOUNTER — RX ONLY (RX ONLY)
Age: 83
End: 2023-06-03

## 2023-06-03 DIAGNOSIS — H35.3112: ICD-10-CM

## 2023-06-03 DIAGNOSIS — H40.1121: ICD-10-CM

## 2023-06-03 DIAGNOSIS — Z96.1: ICD-10-CM

## 2023-06-03 DIAGNOSIS — H35.3221: ICD-10-CM

## 2023-06-03 DIAGNOSIS — H25.89: ICD-10-CM

## 2023-06-03 PROCEDURE — 99213 OFFICE O/P EST LOW 20 MIN: CPT | Performed by: OPHTHALMOLOGY

## 2023-06-03 ASSESSMENT — SPHEQUIV_DERIVED
OD_SPHEQUIV: 2.375
OD_SPHEQUIV: 2.495
OD_SPHEQUIV: -0.625
OS_SPHEQUIV: -0.125
OD_SPHEQUIV: 2.5
OS_SPHEQUIV: -0.5

## 2023-06-03 ASSESSMENT — REFRACTION_AUTOREFRACTION
OD_CYLINDER: -1.25
OD_AXIS: 089
OS_SPHERE: UNABLE
OD_SPHERE: 0.00

## 2023-06-03 ASSESSMENT — SUPERFICIAL PUNCTATE KERATITIS (SPK)
OD_SPK: T
OS_SPK: T

## 2023-06-03 ASSESSMENT — REFRACTION_MANIFEST
OD_SPHERE: +3.50
OD_VA1: 20/NI
OD_AXIS: 085
OD_AXIS: 095
OD_VA1: 20/25-2
OD_CYLINDER: -2.25
OD_CYLINDER: -2.51
OS_SPHERE: +0.75
OS_VA1: 20/400
OD_AXIS: 0
OD_CYLINDER: -1.25
OS_CYLINDER: -1.75
OD_VA1: 20/40-2+2
OD_SPHERE: PLANO
OD_VA1: 20/30
OD_CYLINDER: -0.75
OD_AXIS: 0
OS_CYLINDER: -2.00
OD_AXIS: 085
OD_CYLINDER: -2.50
OS_AXIS: 120
OS_VA1: 20/NI
OS_AXIS: 120
OD_VA1: 20/25+2
OD_SPHERE: +3.75
OD_SPHERE: PLANO
OS_SPHERE: +0.50
OD_SPHERE: +3.75

## 2023-06-03 ASSESSMENT — PACHYMETRY
OD_CT_CORRECTION: 3
OS_CT_UM: 524
OS_CT_CORRECTION: 1
OD_CT_UM: 505

## 2023-06-03 ASSESSMENT — AXIALLENGTH_DERIVED
OD_AL: 23.6519
OD_AL: 22.4884
OD_AL: 22.49
OD_AL: 22.5327
OS_AL: 23.5115
OS_AL: 23.3673

## 2023-06-03 ASSESSMENT — REFRACTION_CURRENTRX
OS_SPHERE: +2.25
OS_AXIS: 091
OD_VPRISM_DIRECTION: BF
OD_ADD: +3.25
OD_OVR_VA: 20/
OS_OVR_VA: 20/
OS_CYLINDER: -0.75
OS_ADD: +3.25
OS_VPRISM_DIRECTION: BF
OD_CYLINDER: -2.00
OD_AXIS: 096
OD_SPHERE: +3.25

## 2023-06-03 ASSESSMENT — VISUAL ACUITY
OD_BCVA: 20/HM
OS_BCVA: 20/40

## 2023-06-03 ASSESSMENT — TONOMETRY
OD_IOP_MMHG: 17
OS_IOP_MMHG: 19

## 2023-06-03 ASSESSMENT — KERATOMETRY
OS_K1POWER_DIOPTERS: 44.00
OD_K1POWER_DIOPTERS: 44.00
OD_K2POWER_DIOPTERS: 44.00
OS_AXISANGLE_DEGREES: 117
OD_AXISANGLE_DEGREES: 090
OS_K2POWER_DIOPTERS: 44.50

## 2023-06-19 ENCOUNTER — RX RENEWAL (OUTPATIENT)
Age: 83
End: 2023-06-19

## 2023-08-19 ENCOUNTER — APPOINTMENT (OUTPATIENT)
Dept: INTERNAL MEDICINE | Facility: CLINIC | Age: 83
End: 2023-08-19
Payer: MEDICARE

## 2023-08-19 VITALS
SYSTOLIC BLOOD PRESSURE: 120 MMHG | BODY MASS INDEX: 23.41 KG/M2 | HEIGHT: 61 IN | WEIGHT: 124 LBS | DIASTOLIC BLOOD PRESSURE: 80 MMHG

## 2023-08-19 DIAGNOSIS — I10 ESSENTIAL (PRIMARY) HYPERTENSION: ICD-10-CM

## 2023-08-19 PROCEDURE — 99213 OFFICE O/P EST LOW 20 MIN: CPT

## 2023-08-19 RX ORDER — METFORMIN ER 500 MG 500 MG/1
500 TABLET ORAL
Qty: 360 | Refills: 3 | Status: ACTIVE | COMMUNITY
Start: 2018-03-12 | End: 1900-01-01

## 2023-08-19 RX ORDER — ATORVASTATIN CALCIUM 10 MG/1
10 TABLET, FILM COATED ORAL
Qty: 90 | Refills: 0 | Status: ACTIVE | OUTPATIENT
Start: 2018-03-12

## 2023-08-19 RX ORDER — ASPIRIN ENTERIC COATED TABLETS 81 MG 81 MG/1
81 TABLET, DELAYED RELEASE ORAL
Qty: 90 | Refills: 3 | Status: ACTIVE | COMMUNITY
Start: 2019-07-16

## 2023-08-23 PROBLEM — I10 BENIGN ESSENTIAL HTN: Status: ACTIVE | Noted: 2018-08-27

## 2023-08-23 NOTE — HISTORY OF PRESENT ILLNESS
[FreeTextEntry1] :  f/u  [de-identified] : DM - watching diet - on metformin   Hypothyroidism - on med   HLD - on med

## 2023-11-18 ENCOUNTER — OFFICE (OUTPATIENT)
Dept: URBAN - METROPOLITAN AREA CLINIC 35 | Facility: CLINIC | Age: 83
Setting detail: OPHTHALMOLOGY
End: 2023-11-18
Payer: MEDICARE

## 2023-11-18 DIAGNOSIS — H25.89: ICD-10-CM

## 2023-11-18 DIAGNOSIS — H35.3112: ICD-10-CM

## 2023-11-18 DIAGNOSIS — Z96.1: ICD-10-CM

## 2023-11-18 DIAGNOSIS — H40.1121: ICD-10-CM

## 2023-11-18 DIAGNOSIS — H35.3221: ICD-10-CM

## 2023-11-18 PROCEDURE — 92083 EXTENDED VISUAL FIELD XM: CPT | Performed by: OPHTHALMOLOGY

## 2023-11-18 PROCEDURE — 92133 CPTRZD OPH DX IMG PST SGM ON: CPT | Performed by: OPHTHALMOLOGY

## 2023-11-18 PROCEDURE — 99213 OFFICE O/P EST LOW 20 MIN: CPT | Performed by: OPHTHALMOLOGY

## 2023-11-18 ASSESSMENT — REFRACTION_MANIFEST
OD_CYLINDER: -0.75
OS_SPHERE: +0.75
OD_VA1: 20/40-2+2
OD_VA1: 20/25-2
OS_AXIS: 120
OS_VA1: 20/400
OD_SPHERE: +3.75
OD_CYLINDER: -1.25
OD_AXIS: 0
OS_AXIS: 120
OD_AXIS: 085
OD_AXIS: 085
OD_SPHERE: +3.50
OD_AXIS: 095
OD_CYLINDER: -2.50
OS_CYLINDER: -2.00
OS_VA1: 20/NI
OD_SPHERE: PLANO
OD_CYLINDER: -2.25
OD_CYLINDER: -2.51
OD_VA1: 20/NI
OD_VA1: 20/25+2
OD_VA1: 20/30
OS_SPHERE: +0.50
OS_CYLINDER: -1.75
OD_SPHERE: +3.75
OD_AXIS: 0
OD_SPHERE: PLANO

## 2023-11-18 ASSESSMENT — REFRACTION_CURRENTRX
OS_OVR_VA: 20/
OS_CYLINDER: -0.50
OD_SPHERE: +3.25
OS_AXIS: 097
OD_AXIS: 112
OD_CYLINDER: -0.50
OS_ADD: +3.00
OD_ADD: +3.00
OS_ADD: +3.25
OS_AXIS: 091
OD_OVR_VA: 20/
OS_SPHERE: +2.25
OD_ADD: +3.25
OS_CYLINDER: -0.75
OS_OVR_VA: 20/
OD_VPRISM_DIRECTION: BF
OD_VPRISM_DIRECTION: BF
OD_CYLINDER: -2.00
OS_VPRISM_DIRECTION: BF
OD_AXIS: 096
OD_SPHERE: PLANO
OS_SPHERE: PLANO
OD_OVR_VA: 20/
OS_VPRISM_DIRECTION: BF

## 2023-11-18 ASSESSMENT — SUPERFICIAL PUNCTATE KERATITIS (SPK)
OS_SPK: T
OD_SPK: T

## 2023-11-18 ASSESSMENT — SPHEQUIV_DERIVED
OD_SPHEQUIV: 2.375
OD_SPHEQUIV: 2.5
OD_SPHEQUIV: -0.125
OD_SPHEQUIV: 2.495
OS_SPHEQUIV: -0.375
OS_SPHEQUIV: -0.5
OS_SPHEQUIV: -0.125

## 2023-11-18 ASSESSMENT — REFRACTION_AUTOREFRACTION
OS_SPHERE: +0.25
OD_CYLINDER: -1.25
OD_SPHERE: +0.50
OD_AXIS: 097
OS_AXIS: 104
OS_CYLINDER: -1.25

## 2023-11-25 ENCOUNTER — APPOINTMENT (OUTPATIENT)
Dept: INTERNAL MEDICINE | Facility: CLINIC | Age: 83
End: 2023-11-25
Payer: MEDICARE

## 2023-11-25 ENCOUNTER — NON-APPOINTMENT (OUTPATIENT)
Age: 83
End: 2023-11-25

## 2023-11-25 VITALS
DIASTOLIC BLOOD PRESSURE: 70 MMHG | BODY MASS INDEX: 23.6 KG/M2 | WEIGHT: 125 LBS | HEIGHT: 61 IN | SYSTOLIC BLOOD PRESSURE: 130 MMHG

## 2023-11-25 DIAGNOSIS — Z00.00 ENCOUNTER FOR GENERAL ADULT MEDICAL EXAMINATION W/OUT ABNORMAL FINDINGS: ICD-10-CM

## 2023-11-25 PROCEDURE — G0444 DEPRESSION SCREEN ANNUAL: CPT | Mod: 59

## 2023-11-25 PROCEDURE — 93000 ELECTROCARDIOGRAM COMPLETE: CPT | Mod: 59

## 2023-11-25 PROCEDURE — G0439: CPT

## 2024-01-31 RX ORDER — EZETIMIBE 10 MG/1
10 TABLET ORAL
Qty: 90 | Refills: 1 | Status: ACTIVE | OUTPATIENT
Start: 2018-03-12

## 2024-03-14 ENCOUNTER — APPOINTMENT (OUTPATIENT)
Dept: PULMONOLOGY | Facility: CLINIC | Age: 84
End: 2024-03-14
Payer: MEDICARE

## 2024-03-14 VITALS
BODY MASS INDEX: 23.6 KG/M2 | HEART RATE: 75 BPM | SYSTOLIC BLOOD PRESSURE: 185 MMHG | HEIGHT: 61 IN | WEIGHT: 125 LBS | OXYGEN SATURATION: 100 % | RESPIRATION RATE: 15 BRPM | DIASTOLIC BLOOD PRESSURE: 78 MMHG

## 2024-03-14 DIAGNOSIS — J84.10 PULMONARY FIBROSIS, UNSPECIFIED: ICD-10-CM

## 2024-03-14 PROCEDURE — 94729 DIFFUSING CAPACITY: CPT

## 2024-03-14 PROCEDURE — 99213 OFFICE O/P EST LOW 20 MIN: CPT | Mod: 25

## 2024-03-14 PROCEDURE — ZZZZZ: CPT

## 2024-03-14 PROCEDURE — 94726 PLETHYSMOGRAPHY LUNG VOLUMES: CPT

## 2024-03-14 PROCEDURE — 94010 BREATHING CAPACITY TEST: CPT

## 2024-03-14 NOTE — PHYSICAL EXAM
[No Acute Distress] : no acute distress [Normal Appearance] : normal appearance [Normal Rate/Rhythm] : normal rate/rhythm [Normal S1, S2] : normal s1, s2 [No Resp Distress] : no resp distress [No Clubbing] : no clubbing [No Edema] : no edema [Oriented x3] : oriented x3 [TextBox_68] : Crackles both lung bases

## 2024-03-14 NOTE — DISCUSSION/SUMMARY
[FreeTextEntry1] : The patient's pulmonary functions have been relatively stable for 3 years.  I have suggested a follow-up visit a year from now with repeat pulmonary functions and probably imaging.

## 2024-03-14 NOTE — HISTORY OF PRESENT ILLNESS
[TextBox_4] : 84-year-old female noted to have pulmonary fibrosis dating back to 2020.  In 2021 she was noted to have normal lung functions except for a mild reduction in diffusing capacity.  Annual follow-up lung functions have been stable as have her respiratory symptoms.  Her imaging was stable between 2020 and 2022.  Currently she reports no significant dyspnea, coughing or chest discomfort.

## 2024-03-15 ENCOUNTER — RX RENEWAL (OUTPATIENT)
Age: 84
End: 2024-03-15

## 2024-03-15 RX ORDER — LEVOTHYROXINE SODIUM 0.07 MG/1
75 TABLET ORAL
Qty: 90 | Refills: 1 | Status: ACTIVE | COMMUNITY
Start: 2023-05-27 | End: 1900-01-01

## 2024-03-16 ENCOUNTER — OFFICE (OUTPATIENT)
Dept: URBAN - METROPOLITAN AREA CLINIC 35 | Facility: CLINIC | Age: 84
Setting detail: OPHTHALMOLOGY
End: 2024-03-16
Payer: MEDICARE

## 2024-03-16 ENCOUNTER — RX ONLY (RX ONLY)
Age: 84
End: 2024-03-16

## 2024-03-16 DIAGNOSIS — H35.723: ICD-10-CM

## 2024-03-16 DIAGNOSIS — E11.9: ICD-10-CM

## 2024-03-16 DIAGNOSIS — H35.3112: ICD-10-CM

## 2024-03-16 DIAGNOSIS — H16.223: ICD-10-CM

## 2024-03-16 DIAGNOSIS — H40.1121: ICD-10-CM

## 2024-03-16 DIAGNOSIS — Z96.1: ICD-10-CM

## 2024-03-16 DIAGNOSIS — H25.89: ICD-10-CM

## 2024-03-16 DIAGNOSIS — H35.3221: ICD-10-CM

## 2024-03-16 DIAGNOSIS — H35.373: ICD-10-CM

## 2024-03-16 PROCEDURE — 92012 INTRM OPH EXAM EST PATIENT: CPT | Performed by: OPHTHALMOLOGY

## 2024-03-16 ASSESSMENT — REFRACTION_CURRENTRX
OS_VPRISM_DIRECTION: BF
OS_ADD: +3.25
OD_ADD: +3.25
OD_AXIS: 112
OS_CYLINDER: -0.75
OD_ADD: +3.00
OS_OVR_VA: 20/
OD_SPHERE: PLANO
OD_CYLINDER: -2.00
OD_SPHERE: +3.25
OS_SPHERE: +2.25
OD_OVR_VA: 20/
OS_AXIS: 091
OD_VPRISM_DIRECTION: BF
OS_SPHERE: PLANO
OD_OVR_VA: 20/
OS_ADD: +3.00
OS_OVR_VA: 20/
OD_AXIS: 096
OD_VPRISM_DIRECTION: BF
OS_VPRISM_DIRECTION: BF
OS_CYLINDER: -0.50
OD_CYLINDER: -0.50
OS_AXIS: 097

## 2024-03-16 ASSESSMENT — REFRACTION_MANIFEST
OD_AXIS: 095
OD_AXIS: 085
OD_AXIS: 0
OD_CYLINDER: -2.51
OD_VA1: 20/25-2
OD_VA1: 20/25+2
OD_AXIS: 0
OD_CYLINDER: -2.25
OD_SPHERE: PLANO
OD_SPHERE: +3.75
OD_VA1: 20/30
OD_SPHERE: PLANO
OD_CYLINDER: -0.75
OD_AXIS: 085
OS_AXIS: 120
OS_VA1: 20/400
OS_CYLINDER: -2.00
OS_SPHERE: +0.75
OD_SPHERE: +3.75
OD_CYLINDER: -1.25
OD_VA1: 20/NI
OS_VA1: 20/NI
OS_SPHERE: +0.50
OD_CYLINDER: -2.50
OS_AXIS: 120
OD_SPHERE: +3.50
OD_VA1: 20/40-2+2
OS_CYLINDER: -1.75

## 2024-03-16 ASSESSMENT — SPHEQUIV_DERIVED
OD_SPHEQUIV: 2.495
OS_SPHEQUIV: -0.5
OD_SPHEQUIV: 2.375
OD_SPHEQUIV: 2.5
OS_SPHEQUIV: -0.125

## 2024-05-04 ENCOUNTER — APPOINTMENT (OUTPATIENT)
Dept: INTERNAL MEDICINE | Facility: CLINIC | Age: 84
End: 2024-05-04
Payer: MEDICARE

## 2024-05-04 VITALS — DIASTOLIC BLOOD PRESSURE: 70 MMHG | SYSTOLIC BLOOD PRESSURE: 130 MMHG

## 2024-05-04 VITALS — HEIGHT: 61 IN | BODY MASS INDEX: 23.6 KG/M2 | WEIGHT: 125 LBS

## 2024-05-04 DIAGNOSIS — E78.5 HYPERLIPIDEMIA, UNSPECIFIED: ICD-10-CM

## 2024-05-04 DIAGNOSIS — E78.5 TYPE 2 DIABETES MELLITUS WITH OTHER SPECIFIED COMPLICATION: ICD-10-CM

## 2024-05-04 DIAGNOSIS — R80.9 TYPE 2 DIABETES MELLITUS WITH OTHER DIABETIC KIDNEY COMPLICATION: ICD-10-CM

## 2024-05-04 DIAGNOSIS — E11.29 TYPE 2 DIABETES MELLITUS WITH OTHER DIABETIC KIDNEY COMPLICATION: ICD-10-CM

## 2024-05-04 DIAGNOSIS — E03.9 HYPOTHYROIDISM, UNSPECIFIED: ICD-10-CM

## 2024-05-04 DIAGNOSIS — E11.69 TYPE 2 DIABETES MELLITUS WITH OTHER SPECIFIED COMPLICATION: ICD-10-CM

## 2024-05-04 DIAGNOSIS — M81.0 AGE-RELATED OSTEOPOROSIS W/OUT CURRENT PATHOLOGICAL FRACTURE: ICD-10-CM

## 2024-05-04 DIAGNOSIS — I25.10 ATHEROSCLEROTIC HEART DISEASE OF NATIVE CORONARY ARTERY W/OUT ANGINA PECTORIS: ICD-10-CM

## 2024-05-04 PROCEDURE — G2211 COMPLEX E/M VISIT ADD ON: CPT

## 2024-05-04 PROCEDURE — 99214 OFFICE O/P EST MOD 30 MIN: CPT

## 2024-05-04 RX ORDER — ALENDRONATE SODIUM 70 MG/1
70 TABLET ORAL
Qty: 5 | Refills: 2 | Status: ACTIVE | COMMUNITY
Start: 2024-05-04

## 2024-05-06 PROBLEM — E03.9 HYPOTHYROIDISM: Status: ACTIVE | Noted: 2018-08-25

## 2024-05-06 PROBLEM — E11.69 TYPE 2 DIABETES MELLITUS WITH HYPERLIPIDEMIA: Status: ACTIVE | Noted: 2018-08-27

## 2024-05-06 RX ORDER — BLOOD-GLUCOSE METER
W/DEVICE EACH MISCELLANEOUS
Qty: 1 | Refills: 0 | Status: ACTIVE | COMMUNITY
Start: 2024-05-06 | End: 1900-01-01

## 2024-05-06 RX ORDER — BLOOD SUGAR DIAGNOSTIC
STRIP MISCELLANEOUS
Qty: 100 | Refills: 1 | Status: DISCONTINUED | COMMUNITY
Start: 2024-05-04 | End: 2024-05-06

## 2024-05-06 RX ORDER — BLOOD-GLUCOSE METER
W/DEVICE EACH MISCELLANEOUS
Qty: 1 | Refills: 0 | Status: DISCONTINUED | COMMUNITY
Start: 2024-05-04 | End: 2024-05-06

## 2024-05-06 RX ORDER — BLOOD SUGAR DIAGNOSTIC
STRIP MISCELLANEOUS
Qty: 1 | Refills: 3 | Status: ACTIVE | COMMUNITY
Start: 2024-05-06 | End: 1900-01-01

## 2024-05-06 NOTE — HISTORY OF PRESENT ILLNESS
[Spouse] : spouse [FreeTextEntry1] : f/u  [de-identified] : in interval - saw pulm - stable pulmonary fibrosis in interval - saw cardiologist Dr. Garcia - has f/u in 6/24  in interval -saw gyn - gyn referred to rheum for osteoporosis - now on alendronate   DM2 - on metformin  unable to tolerate januvia and sulfonylurea  HTN - on med  HLD - on med

## 2024-05-07 RX ORDER — LANCETS 33 GAUGE
EACH MISCELLANEOUS
Qty: 1 | Refills: 3 | Status: ACTIVE | COMMUNITY
Start: 2024-05-07 | End: 1900-01-01

## 2024-05-07 RX ORDER — BLOOD SUGAR DIAGNOSTIC
STRIP MISCELLANEOUS
Qty: 100 | Refills: 5 | Status: DISCONTINUED | OUTPATIENT
Start: 2017-12-19 | End: 2024-05-07

## 2024-07-20 ENCOUNTER — DOCTOR'S OFFICE (OUTPATIENT)
Age: 84
Setting detail: OPHTHALMOLOGY
End: 2024-07-20
Payer: MEDICARE

## 2024-07-20 DIAGNOSIS — H40.1121: ICD-10-CM

## 2024-07-20 DIAGNOSIS — H35.723: ICD-10-CM

## 2024-07-20 DIAGNOSIS — H25.89: ICD-10-CM

## 2024-07-20 DIAGNOSIS — H35.373: ICD-10-CM

## 2024-07-20 DIAGNOSIS — E11.9: ICD-10-CM

## 2024-07-20 DIAGNOSIS — H35.3112: ICD-10-CM

## 2024-07-20 DIAGNOSIS — H35.3221: ICD-10-CM

## 2024-07-20 DIAGNOSIS — H16.223: ICD-10-CM

## 2024-07-20 DIAGNOSIS — Z96.1: ICD-10-CM

## 2024-07-20 PROCEDURE — 92014 COMPRE OPH EXAM EST PT 1/>: CPT | Performed by: OPHTHALMOLOGY

## 2024-07-20 ASSESSMENT — CONFRONTATIONAL VISUAL FIELD TEST (CVF)
OD_FINDINGS: FULL
OS_COMMENTS: HM

## 2024-07-22 ENCOUNTER — RX RENEWAL (OUTPATIENT)
Age: 84
End: 2024-07-22

## 2024-09-14 ENCOUNTER — APPOINTMENT (OUTPATIENT)
Dept: INTERNAL MEDICINE | Facility: CLINIC | Age: 84
End: 2024-09-14
Payer: MEDICARE

## 2024-09-14 VITALS
SYSTOLIC BLOOD PRESSURE: 140 MMHG | HEIGHT: 61 IN | BODY MASS INDEX: 23.03 KG/M2 | DIASTOLIC BLOOD PRESSURE: 70 MMHG | WEIGHT: 122 LBS

## 2024-09-14 DIAGNOSIS — I10 ESSENTIAL (PRIMARY) HYPERTENSION: ICD-10-CM

## 2024-09-14 DIAGNOSIS — E11.29 TYPE 2 DIABETES MELLITUS WITH OTHER DIABETIC KIDNEY COMPLICATION: ICD-10-CM

## 2024-09-14 DIAGNOSIS — R80.9 TYPE 2 DIABETES MELLITUS WITH OTHER DIABETIC KIDNEY COMPLICATION: ICD-10-CM

## 2024-09-14 DIAGNOSIS — E03.9 HYPOTHYROIDISM, UNSPECIFIED: ICD-10-CM

## 2024-09-14 DIAGNOSIS — E78.5 HYPERLIPIDEMIA, UNSPECIFIED: ICD-10-CM

## 2024-09-14 PROCEDURE — G2211 COMPLEX E/M VISIT ADD ON: CPT

## 2024-09-14 PROCEDURE — 99214 OFFICE O/P EST MOD 30 MIN: CPT

## 2024-09-14 RX ORDER — OLMESARTAN MEDOXOMIL 40 MG/1
40 TABLET, FILM COATED ORAL
Qty: 90 | Refills: 1 | Status: ACTIVE | COMMUNITY
Start: 2024-09-14

## 2024-09-14 NOTE — PHYSICAL EXAM
[No Acute Distress] : no acute distress [Well Nourished] : well nourished [No Accessory Muscle Use] : no accessory muscle use [Clear to Auscultation] : lungs were clear to auscultation bilaterally [Regular Rhythm] : with a regular rhythm [Normal S1, S2] : normal S1 and S2 [No Edema] : there was no peripheral edema [No Murmur] : no murmur heard [Soft] : abdomen soft [Non Tender] : non-tender [Non-distended] : non-distended [No Masses] : no abdominal mass palpated [No HSM] : no HSM [Normal Bowel Sounds] : normal bowel sounds [Normal Affect] : the affect was normal [Normal Insight/Judgement] : insight and judgment were intact

## 2024-09-14 NOTE — HISTORY OF PRESENT ILLNESS
[Spouse] : spouse [FreeTextEntry1] :  f/u  [de-identified] : in interval - specialist changed from valsartan to olmesartan as per pt has upcoming appt to see cardiologist Dr. Motta  no acute complaints  DM - on metformin   Hypothyroidism -on levothyroxine 75mcg daily   had RSV vaccine last month also had updated covid vaccine

## 2024-11-16 ENCOUNTER — DOCTOR'S OFFICE (OUTPATIENT)
Facility: LOCATION | Age: 84
Setting detail: OPHTHALMOLOGY
End: 2024-11-16
Payer: MEDICARE

## 2024-11-16 DIAGNOSIS — H40.1121: ICD-10-CM

## 2024-11-16 PROCEDURE — 92083 EXTENDED VISUAL FIELD XM: CPT | Performed by: OPHTHALMOLOGY

## 2024-11-16 PROCEDURE — 92014 COMPRE OPH EXAM EST PT 1/>: CPT | Performed by: OPHTHALMOLOGY

## 2024-11-16 PROCEDURE — 92133 CPTRZD OPH DX IMG PST SGM ON: CPT | Performed by: OPHTHALMOLOGY

## 2024-11-16 ASSESSMENT — REFRACTION_MANIFEST
OD_VA1: 20/40-2+2
OS_VA1: 20/400
OS_SPHERE: +0.50
OS_CYLINDER: -1.75
OD_AXIS: 0
OD_VA1: 20/25-2
OD_VA1: 20/NI
OD_SPHERE: +3.50
OD_AXIS: 085
OS_SPHERE: +0.75
OS_AXIS: 120
OD_VA1: 20/30
OD_AXIS: 095
OS_AXIS: 120
OD_SPHERE: +3.75
OD_VA1: 20/25+2
OD_SPHERE: PLANO
OD_SPHERE: +3.75
OD_CYLINDER: -2.50
OD_CYLINDER: -0.75
OD_AXIS: 085
OD_CYLINDER: -2.25
OS_VA1: 20/NI
OS_CYLINDER: -2.00
OD_SPHERE: PLANO
OD_CYLINDER: -1.25
OD_CYLINDER: -2.51
OD_AXIS: 0

## 2024-11-16 ASSESSMENT — REFRACTION_CURRENTRX
OS_ADD: +3.00
OD_SPHERE: PLANO
OS_AXIS: 091
OS_OVR_VA: 20/
OD_SPHERE: PLANO
OD_OVR_VA: 20/
OD_OVR_VA: 20/
OS_SPHERE: PLANO
OD_SPHERE: +3.25
OD_CYLINDER: -0.50
OS_ADD: +3.25
OS_AXIS: 097
OD_AXIS: 096
OD_VPRISM_DIRECTION: BF
OD_VPRISM_DIRECTION: BF
OD_ADD: +3.00
OD_VPRISM_DIRECTION: BF
OS_CYLINDER: -0.75
OS_OVR_VA: 20/
OD_CYLINDER: -2.00
OS_CYLINDER: -0.50
OD_ADD: +2.25
OD_CYLINDER: -0.50
OD_AXIS: 112
OD_OVR_VA: 20/
OS_SPHERE: +2.25
OS_SPHERE: PLANO
OD_AXIS: 108
OS_AXIS: 097
OS_ADD: +2.25
OS_VPRISM_DIRECTION: BF
OS_VPRISM_DIRECTION: BF
OD_ADD: +3.25
OS_OVR_VA: 20/
OS_CYLINDER: -0.50
OS_VPRISM_DIRECTION: BF

## 2024-11-16 ASSESSMENT — VISUAL ACUITY
OS_BCVA: 20/25-2
OD_BCVA: HM

## 2024-11-16 ASSESSMENT — PACHYMETRY
OD_CT_UM: 505
OS_CT_UM: 524
OS_CT_CORRECTION: 1
OD_CT_CORRECTION: 3

## 2024-11-16 ASSESSMENT — SUPERFICIAL PUNCTATE KERATITIS (SPK)
OS_SPK: T
OD_SPK: T

## 2024-11-16 ASSESSMENT — REFRACTION_AUTOREFRACTION
OS_CYLINDER: -0.75
OS_AXIS: 116
OD_SPHERE: +0.50
OS_SPHERE: -0.50
OD_AXIS: 092
OD_CYLINDER: -1.25

## 2024-11-16 ASSESSMENT — KERATOMETRY
OD_K1POWER_DIOPTERS: 43.50
OS_K1POWER_DIOPTERS: 44.25
OS_K2POWER_DIOPTERS: 44.25
OD_K2POWER_DIOPTERS: 43.75
OD_AXISANGLE_DEGREES: 007
OS_AXISANGLE_DEGREES: 090

## 2024-11-16 ASSESSMENT — CONFRONTATIONAL VISUAL FIELD TEST (CVF)
OS_FINDINGS: FULL
OD_FINDINGS: FULL

## 2024-11-16 ASSESSMENT — TONOMETRY
OD_IOP_MMHG: 17
OS_IOP_MMHG: 19

## 2024-12-30 ENCOUNTER — RX RENEWAL (OUTPATIENT)
Age: 84
End: 2024-12-30

## 2025-01-25 ENCOUNTER — APPOINTMENT (OUTPATIENT)
Dept: INTERNAL MEDICINE | Facility: CLINIC | Age: 85
End: 2025-01-25
Payer: MEDICARE

## 2025-01-25 ENCOUNTER — NON-APPOINTMENT (OUTPATIENT)
Age: 85
End: 2025-01-25

## 2025-01-25 VITALS
SYSTOLIC BLOOD PRESSURE: 110 MMHG | WEIGHT: 122 LBS | HEIGHT: 61 IN | DIASTOLIC BLOOD PRESSURE: 70 MMHG | BODY MASS INDEX: 23.03 KG/M2

## 2025-01-25 DIAGNOSIS — E11.29 TYPE 2 DIABETES MELLITUS WITH OTHER DIABETIC KIDNEY COMPLICATION: ICD-10-CM

## 2025-01-25 DIAGNOSIS — M81.0 AGE-RELATED OSTEOPOROSIS W/OUT CURRENT PATHOLOGICAL FRACTURE: ICD-10-CM

## 2025-01-25 DIAGNOSIS — E11.69 TYPE 2 DIABETES MELLITUS WITH OTHER SPECIFIED COMPLICATION: ICD-10-CM

## 2025-01-25 DIAGNOSIS — E78.5 HYPERLIPIDEMIA, UNSPECIFIED: ICD-10-CM

## 2025-01-25 DIAGNOSIS — I10 ESSENTIAL (PRIMARY) HYPERTENSION: ICD-10-CM

## 2025-01-25 DIAGNOSIS — R80.9 TYPE 2 DIABETES MELLITUS WITH OTHER DIABETIC KIDNEY COMPLICATION: ICD-10-CM

## 2025-01-25 DIAGNOSIS — E78.5 TYPE 2 DIABETES MELLITUS WITH OTHER SPECIFIED COMPLICATION: ICD-10-CM

## 2025-01-25 PROCEDURE — 93000 ELECTROCARDIOGRAM COMPLETE: CPT

## 2025-01-25 PROCEDURE — G0439: CPT

## 2025-03-14 ENCOUNTER — APPOINTMENT (OUTPATIENT)
Dept: PULMONOLOGY | Facility: CLINIC | Age: 85
End: 2025-03-14
Payer: MEDICARE

## 2025-03-14 VITALS
HEART RATE: 80 BPM | SYSTOLIC BLOOD PRESSURE: 156 MMHG | BODY MASS INDEX: 22.66 KG/M2 | HEIGHT: 61 IN | TEMPERATURE: 96.8 F | DIASTOLIC BLOOD PRESSURE: 65 MMHG | WEIGHT: 120 LBS | OXYGEN SATURATION: 98 %

## 2025-03-14 DIAGNOSIS — J84.10 PULMONARY FIBROSIS, UNSPECIFIED: ICD-10-CM

## 2025-03-14 PROCEDURE — 99213 OFFICE O/P EST LOW 20 MIN: CPT

## 2025-04-03 ENCOUNTER — DOCTOR'S OFFICE (OUTPATIENT)
Facility: LOCATION | Age: 85
Setting detail: OPHTHALMOLOGY
End: 2025-04-03
Payer: MEDICARE

## 2025-04-03 DIAGNOSIS — H40.1121: ICD-10-CM

## 2025-04-03 DIAGNOSIS — E11.9: ICD-10-CM

## 2025-04-03 DIAGNOSIS — H35.3112: ICD-10-CM

## 2025-04-03 DIAGNOSIS — H35.3221: ICD-10-CM

## 2025-04-03 DIAGNOSIS — H26.491: ICD-10-CM

## 2025-04-03 PROCEDURE — 92014 COMPRE OPH EXAM EST PT 1/>: CPT | Performed by: OPHTHALMOLOGY

## 2025-04-03 PROCEDURE — 92134 CPTRZ OPH DX IMG PST SGM RTA: CPT | Performed by: OPHTHALMOLOGY

## 2025-04-03 ASSESSMENT — PACHYMETRY
OD_CT_UM: 505
OD_CT_CORRECTION: 3
OS_CT_UM: 524
OS_CT_CORRECTION: 1

## 2025-04-03 ASSESSMENT — REFRACTION_MANIFEST
OD_VA1: 20/25+2
OD_SPHERE: +3.50
OD_VA1: 20/40-2+2
OS_SPHERE: +0.50
OS_AXIS: 120
OS_SPHERE: +0.75
OD_AXIS: 085
OS_VA1: 20/NI
OD_AXIS: 0
OD_SPHERE: PLANO
OD_CYLINDER: -1.25
OD_VA1: 20/25-2
OD_AXIS: 0
OD_SPHERE: PLANO
OS_CYLINDER: -1.75
OS_VA1: 20/400
OS_AXIS: 120
OD_CYLINDER: -2.50
OS_CYLINDER: -2.00
OD_CYLINDER: -2.25
OD_CYLINDER: -0.75
OD_VA1: 20/NI
OD_SPHERE: +3.75
OD_CYLINDER: -2.51
OD_AXIS: 095
OD_VA1: 20/30
OD_SPHERE: +3.75
OD_AXIS: 085

## 2025-04-03 ASSESSMENT — REFRACTION_CURRENTRX
OD_CYLINDER: -0.50
OS_ADD: +3.25
OD_AXIS: 096
OS_ADD: +2.25
OD_OVR_VA: 20/
OS_CYLINDER: -0.50
OS_SPHERE: PLANO
OD_VPRISM_DIRECTION: BF
OS_AXIS: 097
OS_SPHERE: PLANO
OD_OVR_VA: 20/
OD_VPRISM_DIRECTION: BF
OS_AXIS: 091
OS_OVR_VA: 20/
OD_ADD: +3.25
OS_SPHERE: +2.25
OS_OVR_VA: 20/
OS_OVR_VA: 20/
OD_CYLINDER: -2.00
OD_SPHERE: PLANO
OS_CYLINDER: -0.75
OS_CYLINDER: -0.50
OS_AXIS: 097
OD_VPRISM_DIRECTION: BF
OD_ADD: +2.25
OD_ADD: +3.00
OD_CYLINDER: -0.50
OS_VPRISM_DIRECTION: BF
OD_AXIS: 108
OD_SPHERE: PLANO
OS_VPRISM_DIRECTION: BF
OS_VPRISM_DIRECTION: BF
OS_ADD: +3.00
OD_AXIS: 112
OD_SPHERE: +3.25
OD_OVR_VA: 20/

## 2025-04-03 ASSESSMENT — KERATOMETRY
OS_K1POWER_DIOPTERS: 44.25
OD_K2POWER_DIOPTERS: 44.00
OD_K1POWER_DIOPTERS: 43.75
OS_AXISANGLE_DEGREES: 117
OS_K2POWER_DIOPTERS: 44.50
OD_AXISANGLE_DEGREES: 116

## 2025-04-03 ASSESSMENT — REFRACTION_AUTOREFRACTION
OD_AXIS: 094
OD_SPHERE: +0.50
OS_AXIS: 095
OD_CYLINDER: -1.25
OS_SPHERE: 0.00
OS_CYLINDER: -1.25

## 2025-04-03 ASSESSMENT — SUPERFICIAL PUNCTATE KERATITIS (SPK)
OD_SPK: T
OS_SPK: T

## 2025-04-03 ASSESSMENT — VISUAL ACUITY
OS_BCVA: 20/25-1
OD_BCVA: HM

## 2025-04-03 ASSESSMENT — CONFRONTATIONAL VISUAL FIELD TEST (CVF)
OS_FINDINGS: FULL
OD_FINDINGS: FULL

## 2025-04-03 ASSESSMENT — TONOMETRY
OS_IOP_MMHG: 17
OD_IOP_MMHG: 17

## 2025-04-21 ENCOUNTER — APPOINTMENT (OUTPATIENT)
Dept: PULMONOLOGY | Facility: CLINIC | Age: 85
End: 2025-04-21
Payer: MEDICARE

## 2025-04-21 PROCEDURE — 94010 BREATHING CAPACITY TEST: CPT

## 2025-04-21 PROCEDURE — 94729 DIFFUSING CAPACITY: CPT

## 2025-04-21 PROCEDURE — 94726 PLETHYSMOGRAPHY LUNG VOLUMES: CPT

## 2025-05-24 ENCOUNTER — APPOINTMENT (OUTPATIENT)
Dept: INTERNAL MEDICINE | Facility: CLINIC | Age: 85
End: 2025-05-24
Payer: MEDICARE

## 2025-05-24 VITALS
BODY MASS INDEX: 23.22 KG/M2 | HEIGHT: 61 IN | DIASTOLIC BLOOD PRESSURE: 78 MMHG | WEIGHT: 123 LBS | SYSTOLIC BLOOD PRESSURE: 120 MMHG

## 2025-05-24 VITALS — WEIGHT: 123 LBS | HEIGHT: 61 IN | BODY MASS INDEX: 23.22 KG/M2

## 2025-05-24 DIAGNOSIS — E78.5 TYPE 2 DIABETES MELLITUS WITH OTHER SPECIFIED COMPLICATION: ICD-10-CM

## 2025-05-24 DIAGNOSIS — E03.9 HYPOTHYROIDISM, UNSPECIFIED: ICD-10-CM

## 2025-05-24 DIAGNOSIS — E11.69 TYPE 2 DIABETES MELLITUS WITH OTHER SPECIFIED COMPLICATION: ICD-10-CM

## 2025-05-24 DIAGNOSIS — E78.5 HYPERLIPIDEMIA, UNSPECIFIED: ICD-10-CM

## 2025-05-24 DIAGNOSIS — R80.9 TYPE 2 DIABETES MELLITUS WITH OTHER DIABETIC KIDNEY COMPLICATION: ICD-10-CM

## 2025-05-24 DIAGNOSIS — K83.8 OTHER SPECIFIED DISEASES OF BILIARY TRACT: ICD-10-CM

## 2025-05-24 DIAGNOSIS — I10 ESSENTIAL (PRIMARY) HYPERTENSION: ICD-10-CM

## 2025-05-24 DIAGNOSIS — E11.29 TYPE 2 DIABETES MELLITUS WITH OTHER DIABETIC KIDNEY COMPLICATION: ICD-10-CM

## 2025-05-24 DIAGNOSIS — R93.2 ABNORMAL FINDINGS ON DIAGNOSTIC IMAGING OF LIVER AND BILIARY TRACT: ICD-10-CM

## 2025-05-24 DIAGNOSIS — M54.12 RADICULOPATHY, CERVICAL REGION: ICD-10-CM

## 2025-05-24 PROCEDURE — 99214 OFFICE O/P EST MOD 30 MIN: CPT

## 2025-05-24 PROCEDURE — 99497 ADVNCD CARE PLAN 30 MIN: CPT

## 2025-05-24 PROCEDURE — G2211 COMPLEX E/M VISIT ADD ON: CPT

## 2025-05-24 RX ORDER — LEVOTHYROXINE SODIUM 0.07 MG/1
75 TABLET ORAL
Qty: 90 | Refills: 1 | Status: ACTIVE | COMMUNITY
Start: 2025-05-24 | End: 1900-01-01

## 2025-06-25 ENCOUNTER — RX RENEWAL (OUTPATIENT)
Age: 85
End: 2025-06-25

## 2025-06-25 RX ORDER — BLOOD SUGAR DIAGNOSTIC
STRIP MISCELLANEOUS
Qty: 100 | Refills: 1 | Status: ACTIVE | COMMUNITY
Start: 2025-06-25 | End: 1900-01-01

## 2025-07-07 ENCOUNTER — RX RENEWAL (OUTPATIENT)
Age: 85
End: 2025-07-07

## 2025-09-06 ENCOUNTER — APPOINTMENT (OUTPATIENT)
Dept: INTERNAL MEDICINE | Facility: CLINIC | Age: 85
End: 2025-09-06
Payer: MEDICARE

## 2025-09-06 VITALS
BODY MASS INDEX: 22.84 KG/M2 | WEIGHT: 121 LBS | DIASTOLIC BLOOD PRESSURE: 60 MMHG | HEIGHT: 61 IN | SYSTOLIC BLOOD PRESSURE: 120 MMHG

## 2025-09-06 DIAGNOSIS — K83.8 OTHER SPECIFIED DISEASES OF BILIARY TRACT: ICD-10-CM

## 2025-09-06 DIAGNOSIS — E11.29 TYPE 2 DIABETES MELLITUS WITH OTHER DIABETIC KIDNEY COMPLICATION: ICD-10-CM

## 2025-09-06 DIAGNOSIS — R93.2 ABNORMAL FINDINGS ON DIAGNOSTIC IMAGING OF LIVER AND BILIARY TRACT: ICD-10-CM

## 2025-09-06 DIAGNOSIS — E78.5 TYPE 2 DIABETES MELLITUS WITH OTHER SPECIFIED COMPLICATION: ICD-10-CM

## 2025-09-06 DIAGNOSIS — R80.9 TYPE 2 DIABETES MELLITUS WITH OTHER DIABETIC KIDNEY COMPLICATION: ICD-10-CM

## 2025-09-06 DIAGNOSIS — K76.0 FATTY (CHANGE OF) LIVER, NOT ELSEWHERE CLASSIFIED: ICD-10-CM

## 2025-09-06 DIAGNOSIS — I10 ESSENTIAL (PRIMARY) HYPERTENSION: ICD-10-CM

## 2025-09-06 DIAGNOSIS — E78.5 HYPERLIPIDEMIA, UNSPECIFIED: ICD-10-CM

## 2025-09-06 DIAGNOSIS — E03.9 HYPOTHYROIDISM, UNSPECIFIED: ICD-10-CM

## 2025-09-06 DIAGNOSIS — E11.69 TYPE 2 DIABETES MELLITUS WITH OTHER SPECIFIED COMPLICATION: ICD-10-CM

## 2025-09-06 PROCEDURE — G2211 COMPLEX E/M VISIT ADD ON: CPT

## 2025-09-06 PROCEDURE — 99214 OFFICE O/P EST MOD 30 MIN: CPT
